# Patient Record
Sex: MALE | Race: WHITE | NOT HISPANIC OR LATINO | Employment: OTHER | URBAN - NONMETROPOLITAN AREA
[De-identification: names, ages, dates, MRNs, and addresses within clinical notes are randomized per-mention and may not be internally consistent; named-entity substitution may affect disease eponyms.]

---

## 2017-01-03 ENCOUNTER — HISTORY (OUTPATIENT)
Dept: FAMILY MEDICINE | Facility: OTHER | Age: 57
End: 2017-01-03

## 2017-01-03 ENCOUNTER — OFFICE VISIT - GICH (OUTPATIENT)
Dept: FAMILY MEDICINE | Facility: OTHER | Age: 57
End: 2017-01-03

## 2017-01-03 DIAGNOSIS — J02.9 ACUTE PHARYNGITIS: ICD-10-CM

## 2017-01-03 LAB — STREP A ANTIGEN - HISTORICAL: NEGATIVE

## 2017-01-03 ASSESSMENT — PATIENT HEALTH QUESTIONNAIRE - PHQ9: SUM OF ALL RESPONSES TO PHQ QUESTIONS 1-9: 0

## 2017-05-11 ENCOUNTER — HISTORY (OUTPATIENT)
Dept: FAMILY MEDICINE | Facility: OTHER | Age: 57
End: 2017-05-11

## 2017-05-11 ENCOUNTER — OFFICE VISIT - GICH (OUTPATIENT)
Dept: FAMILY MEDICINE | Facility: OTHER | Age: 57
End: 2017-05-11

## 2017-05-11 DIAGNOSIS — R21 RASH AND OTHER NONSPECIFIC SKIN ERUPTION: ICD-10-CM

## 2017-05-11 DIAGNOSIS — B00.1 HERPESVIRAL VESICULAR DERMATITIS: ICD-10-CM

## 2017-09-06 ENCOUNTER — COMMUNICATION - GICH (OUTPATIENT)
Dept: FAMILY MEDICINE | Facility: OTHER | Age: 57
End: 2017-09-06

## 2017-09-06 DIAGNOSIS — R03.0 ELEVATED BLOOD PRESSURE READING WITHOUT DIAGNOSIS OF HYPERTENSION: ICD-10-CM

## 2017-12-03 ENCOUNTER — COMMUNICATION - GICH (OUTPATIENT)
Dept: FAMILY MEDICINE | Facility: OTHER | Age: 57
End: 2017-12-03

## 2017-12-03 DIAGNOSIS — Z00.00 ENCOUNTER FOR GENERAL ADULT MEDICAL EXAMINATION WITHOUT ABNORMAL FINDINGS: ICD-10-CM

## 2017-12-04 ENCOUNTER — COMMUNICATION - GICH (OUTPATIENT)
Dept: FAMILY MEDICINE | Facility: OTHER | Age: 57
End: 2017-12-04

## 2017-12-04 DIAGNOSIS — R03.0 ELEVATED BLOOD PRESSURE READING WITHOUT DIAGNOSIS OF HYPERTENSION: ICD-10-CM

## 2017-12-05 ENCOUNTER — COMMUNICATION - GICH (OUTPATIENT)
Dept: FAMILY MEDICINE | Facility: OTHER | Age: 57
End: 2017-12-05

## 2017-12-05 DIAGNOSIS — B00.1 HERPESVIRAL VESICULAR DERMATITIS: ICD-10-CM

## 2017-12-11 ENCOUNTER — HISTORY (OUTPATIENT)
Dept: FAMILY MEDICINE | Facility: OTHER | Age: 57
End: 2017-12-11

## 2017-12-11 ENCOUNTER — OFFICE VISIT - GICH (OUTPATIENT)
Dept: FAMILY MEDICINE | Facility: OTHER | Age: 57
End: 2017-12-11

## 2017-12-11 DIAGNOSIS — R21 RASH AND OTHER NONSPECIFIC SKIN ERUPTION: ICD-10-CM

## 2017-12-11 DIAGNOSIS — B35.1 TINEA UNGUIUM: ICD-10-CM

## 2017-12-11 DIAGNOSIS — I10 ESSENTIAL (PRIMARY) HYPERTENSION: ICD-10-CM

## 2017-12-28 NOTE — TELEPHONE ENCOUNTER
Patient Information     Patient Name MRN Sex Janell Roth [id number] Male 1960      Telephone Encounter by Lilia Cohn RN at 2017  2:22 PM     Author:  Lilia Cohn RN Service:  (none) Author Type:  NURS- Registered Nurse     Filed:  2017  2:32 PM Encounter Date:  2017 Status:  Signed     :  Lilia Cohn RN (NURS- Registered Nurse)            Ace Inhibitors  Lisinopril     Office visit in the past 12 months or per provider note.    [facility]  Next visit with DONALDO COLEMAN is on: No future appointment listed with this provider  Next visit with Family Practice is on: No future appointment listed in this department    Lab test requirements:  Creatinine and Potassium annually, if ordering lab, order BMP.  CREATININE (mg/dL)    Date Value   2016 1.00     POTASSIUM (mmol/L)    Date Value   2016 4.6       Medications were reviewed at office visits for acute problems, with no annual labs completed since 16. Donaldo Coleman MD indicates no certain plan for follow-up in office visit notes. Will refill for 3 months, and send letter recommending a medication management visit with annual labs.   Prescription refilled per RN Medication Refill Policy.................... Lilia Cohn RN ....................  2017   2:27 PM

## 2018-01-02 NOTE — PROGRESS NOTES
"Patient Information     Patient Name MRJanell Suazo [id number] Male 1960      Progress Notes by Donaldo Coleman MD at 1/3/2017  9:45 AM     Author:  Donaldo Coleman MD Service:  (none) Author Type:  Physician     Filed:  1/3/2017 10:40 AM Encounter Date:  1/3/2017 Status:  Signed     :  Donaldo Coleman MD (Physician)            There are no exam notes on file for this visit.  Janell Larsen is a 56 y.o. male who presents for   Chief Complaint     Patient presents with       Throat Problem      Sore throat     HPI: Mr. Larsen comes in with ST for 4-5 days; hehas been taking cold medications which has raised his BP so this is discouraged. Afeb and no known exposures. Cough is irritating but not bad at night.   Past Medical History     Diagnosis  Date     Cold sore 2013     No past surgical history on file.  Family History       Problem   Relation Age of Onset     Cancer-colon  Father      62       Cancer  Paternal Uncle      80 leukemia       Current Outpatient Prescriptions       Medication  Sig Dispense Refill     acyclovir (ZOVIRAX) 200 mg capsule Take 1 capsule by mouth 5 times daily. 25 capsule 4     lisinopril (PRINIVIL; ZESTRIL) 20 mg tablet Take 1 tablet by mouth once daily. 90 tablet 3     omeprazole (PRILOSEC) 20 mg Delayed-Release capsule Take 1 capsule by mouth once daily before a meal. 90 capsule 3     No current facility-administered medications for this visit.      Medications have been reviewed by me and are current to the best of my knowledge and ability.    Allergies     Allergen  Reactions     Septra [Sulfamethoxazole-Trimethoprim] Rash         EXAM:   Vitals:     17 0946   BP: 168/100   Temp: 97  F (36.1  C)   TempSrc: Temporal   Weight: 90.7 kg (200 lb)   Height: 1.74 m (5' 8.5\")     General Appearance: Pleasant, alert, appropriate appearance for age. No acute distress  Head Exam: Normocephalic, without obvious abnormality.  Ear Exam: Normal TM's " bilaterally. Normal auditory canals and external ears. Non-tender.  OroPharynx Exam: Teeth: normal dentition and gums  Neck Exam: neck supple with no rigidity  Chest/Respiratory Exam: Normal chest wall and respirations. Clear to auscultation.  Cardiovascular Exam: Regular rate and rhythm. S1, S2, no murmur, click, gallop, or rubs.  ASSESSMENT AND PLAN:  1. Sore throat  Viral vs allergic  - RAPID STREP WITH REFLEX CULTURE; Future  - RAPID STREP WITH REFLEX CULTURE

## 2018-01-05 NOTE — NURSING NOTE
Patient Information     Patient Name MRN Janell Oviedo [id number] Male 1960      Nursing Note by Keyanna Renee at 2017  1:30 PM     Author:  Keyanna Renee Service:  (none) Author Type:  (none)     Filed:  2017  2:07 PM Encounter Date:  2017 Status:  Signed     :  Keyanna Renee            Patient presents to the clinic with a rash on his back.  It's been there about 2 months.  Keyanna Renee LPN....................2017 1:48 PM

## 2018-01-05 NOTE — PROGRESS NOTES
Patient Information     Patient Name MRN Sex Janell Roth [id number] Male 1960      Progress Notes by Donaldo Coleman MD at 2017  1:30 PM     Author:  Donaldo Coleman MD Service:  (none) Author Type:  Physician     Filed:  2017  2:16 PM Encounter Date:  2017 Status:  Signed     :  Donaldo Coleman MD (Physician)            Nursing Notes:   Keyanna Renee  2017  2:07 PM  Signed  Patient presents to the clinic with a rash on his back.  It's been there about 2 months.  Keyanna DEEPIKAMary Renee LPN....................2017 1:48 PM    Janell Larsen is a 57 y.o. male who presents for   Chief Complaint     Patient presents with       Rash      on back     HPI: Mr. Larsen has an itchy 2.5 cm slight raised rash right side upper back that has been there 2 months. No specific memory of specific incident that started this . Pruritic without pain. Started small and grew but same size past month.   Past Medical History:     Diagnosis  Date     Cold sore 2013     No past surgical history on file.  Family History       Problem   Relation Age of Onset     Cancer-colon  Father      62       Cancer  Paternal Uncle      80 leukemia       Current Outpatient Prescriptions       Medication  Sig Dispense Refill     acyclovir (ZOVIRAX) 200 mg capsule Take 1 capsule by mouth 5 times daily. 25 capsule 4     lisinopril (PRINIVIL; ZESTRIL) 20 mg tablet Take 1 tablet by mouth once daily. 90 tablet 3     omeprazole (PRILOSEC) 20 mg Delayed-Release capsule Take 1 capsule by mouth once daily before a meal. 90 capsule 3     No current facility-administered medications for this visit.      Medications have been reviewed by me and are current to the best of my knowledge and ability.    Allergies     Allergen  Reactions     Septra [Sulfamethoxazole-Trimethoprim] Rash        EXAM:   Vitals:     17 1349   BP: 130/88   Temp: 98.1  F (36.7  C)   TempSrc: Temporal   Weight: 90.3 kg (199 lb)  "  Height: 1.74 m (5' 8.5\")     General Appearance: Pleasant, alert, appropriate appearance for age. No acute distress  Skin: has 2.5 cm raised red somewhat dry looking rash- no drainage  ASSESSMENT AND PLAN:  1. Rash  Antibiotic in case of bacteria and antifungal in case of tinea which is the diagnosis I favor                 "

## 2018-01-11 ENCOUNTER — HISTORY (OUTPATIENT)
Dept: FAMILY MEDICINE | Facility: OTHER | Age: 58
End: 2018-01-11

## 2018-01-11 ENCOUNTER — OFFICE VISIT - GICH (OUTPATIENT)
Dept: FAMILY MEDICINE | Facility: OTHER | Age: 58
End: 2018-01-11

## 2018-01-11 DIAGNOSIS — R21 RASH AND OTHER NONSPECIFIC SKIN ERUPTION: ICD-10-CM

## 2018-01-24 ENCOUNTER — DOCUMENTATION ONLY (OUTPATIENT)
Dept: FAMILY MEDICINE | Facility: OTHER | Age: 58
End: 2018-01-24

## 2018-01-24 PROBLEM — I10 HYPERTENSION: Status: ACTIVE | Noted: 2017-12-11

## 2018-01-24 RX ORDER — LISINOPRIL 20 MG/1
20 TABLET ORAL DAILY
COMMUNITY
Start: 2017-12-05 | End: 2018-06-04

## 2018-01-24 RX ORDER — TRIAMCINOLONE ACETONIDE 1 MG/G
CREAM TOPICAL 3 TIMES DAILY
COMMUNITY
Start: 2017-05-11 | End: 2018-08-24

## 2018-01-24 RX ORDER — NYSTATIN 100000 U/G
CREAM TOPICAL
COMMUNITY
Start: 2017-12-11 | End: 2018-08-24

## 2018-01-24 RX ORDER — ACYCLOVIR 200 MG/1
1 CAPSULE ORAL
COMMUNITY
Start: 2017-12-07 | End: 2018-08-24

## 2018-01-25 ENCOUNTER — OFFICE VISIT - GICH (OUTPATIENT)
Dept: FAMILY MEDICINE | Facility: OTHER | Age: 58
End: 2018-01-25

## 2018-01-25 ENCOUNTER — HISTORY (OUTPATIENT)
Dept: FAMILY MEDICINE | Facility: OTHER | Age: 58
End: 2018-01-25

## 2018-01-25 DIAGNOSIS — B08.4 ENTEROVIRAL VESICULAR STOMATITIS WITH EXANTHEM: ICD-10-CM

## 2018-01-26 VITALS
SYSTOLIC BLOOD PRESSURE: 168 MMHG | BODY MASS INDEX: 29.62 KG/M2 | WEIGHT: 200 LBS | HEIGHT: 69 IN | DIASTOLIC BLOOD PRESSURE: 100 MMHG | TEMPERATURE: 97 F

## 2018-01-26 VITALS
DIASTOLIC BLOOD PRESSURE: 88 MMHG | TEMPERATURE: 98.1 F | SYSTOLIC BLOOD PRESSURE: 130 MMHG | BODY MASS INDEX: 29.47 KG/M2 | HEIGHT: 69 IN | WEIGHT: 199 LBS

## 2018-01-30 ENCOUNTER — HISTORY (OUTPATIENT)
Dept: PEDIATRICS | Facility: OTHER | Age: 58
End: 2018-01-30

## 2018-01-30 ENCOUNTER — OFFICE VISIT - GICH (OUTPATIENT)
Dept: PEDIATRICS | Facility: OTHER | Age: 58
End: 2018-01-30

## 2018-01-30 DIAGNOSIS — R21 RASH AND OTHER NONSPECIFIC SKIN ERUPTION: ICD-10-CM

## 2018-01-31 ENCOUNTER — AMBULATORY - GICH (OUTPATIENT)
Dept: SCHEDULING | Facility: OTHER | Age: 58
End: 2018-01-31

## 2018-02-01 LAB — HSV AB, IGM - HISTORICAL: NEGATIVE

## 2018-02-04 ASSESSMENT — PATIENT HEALTH QUESTIONNAIRE - PHQ9: SUM OF ALL RESPONSES TO PHQ QUESTIONS 1-9: 0

## 2018-02-05 ENCOUNTER — AMBULATORY - GICH (OUTPATIENT)
Dept: SCHEDULING | Facility: OTHER | Age: 58
End: 2018-02-05

## 2018-02-09 VITALS
DIASTOLIC BLOOD PRESSURE: 80 MMHG | WEIGHT: 195.2 LBS | SYSTOLIC BLOOD PRESSURE: 134 MMHG | HEART RATE: 60 BPM | TEMPERATURE: 97.8 F | BODY MASS INDEX: 29.24 KG/M2

## 2018-02-09 VITALS
HEART RATE: 64 BPM | TEMPERATURE: 96.2 F | HEIGHT: 69 IN | WEIGHT: 192 LBS | BODY MASS INDEX: 28.44 KG/M2 | SYSTOLIC BLOOD PRESSURE: 134 MMHG | DIASTOLIC BLOOD PRESSURE: 84 MMHG

## 2018-02-09 VITALS
DIASTOLIC BLOOD PRESSURE: 100 MMHG | HEIGHT: 69 IN | WEIGHT: 199 LBS | TEMPERATURE: 97.4 F | BODY MASS INDEX: 29.47 KG/M2 | SYSTOLIC BLOOD PRESSURE: 144 MMHG

## 2018-02-12 NOTE — NURSING NOTE
Patient Information     Patient Name MRN Janell Oviedo [id number] Male 1960      Nursing Note by Keyanna Renee at 2018 12:00 PM     Author:  Keyanna Renee Service:  (none) Author Type:  (none)     Filed:  2018 12:07 PM Encounter Date:  2018 Status:  Signed     :  Keyanna Renee            Patient presents to the clinic with some painful bumps on his hands, he noticed them 6 days ago.   Not they are moving to other parts of his body.  Keyanna Renee LPN....................2018 11:56 AM

## 2018-02-12 NOTE — TELEPHONE ENCOUNTER
Patient Information     Patient Name MRN Sex Janell Roth [id number] Male 1960      Telephone Encounter by Lilia Marshall RN at 2017  9:04 AM     Author:  Lilia Marshall RN Service:  (none) Author Type:  NURS- Registered Nurse     Filed:  2017  9:09 AM Encounter Date:  2017 Status:  Signed     :  Lilia Marshall RN (NURS- Registered Nurse)            [facility] pharmacy and pt request a refill Rx for lisinopril (Prinivil; Zestril).    Ace Inhibitors    Office visit in the past 12 months or per provider note.    Last visit with ANGEILNA STARR was on: 2017 in [facility]  Next visit with ANGELINA STARR is on: No future appointment listed with this provider  Next visit with Family Practice is on: No future appointment listed in this department    Lab test requirements:  Creatinine and Potassium annually, if ordering lab, order BMP.  CREATININE (mg/dL)    Date Value   2016 1.00     POTASSIUM (mmol/L)    Date Value   2016 4.6       Max refill for 12 months from last office visit or per provider note    Prescription refilled per RN Medication Refill Policy.................... Lilia Marshall RN ....................  2017   9:07 AM

## 2018-02-12 NOTE — TELEPHONE ENCOUNTER
Patient Information     Patient Name MRN Sex Janell Roth [id number] Male 1960      Telephone Encounter by Lilia Viera RN at 2017  4:25 PM     Author:  Lilia Viera RN Service:  (none) Author Type:  NURS- Registered Nurse     Filed:  2017  4:27 PM Encounter Date:  12/3/2017 Status:  Signed     :  Lilia Viera RN (NURS- Registered Nurse)            Proton Pump Inhibitors    Office visit in the past 12 months or per provider note.    [facility]  Next visit with ANGELINA STARR is on: No future appointment listed with this provider  Next visit with Family Practice is on: No future appointment listed in this department    Max refill for 12 months from last office visit or per provider note.    Prescription refilled per RN Medication Refill Policy.................... Lilia Viera RN ....................  2017   4:26 PM

## 2018-02-12 NOTE — NURSING NOTE
Patient Information     Patient Name MRN Janell Oviedo [id number] Male 1960      Nursing Note by Keyanna Renee at 2017  3:15 PM     Author:  Keyanna Renee Service:  (none) Author Type:  (none)     Filed:  2017  3:21 PM Encounter Date:  2017 Status:  Signed     :  Keyanna Renee            Patient presents to the clinic with foot pain.  He's had trouble with his toes on the right and pain in his left.   It's been going on for 2 1/2 months.    Keyanna Renee LPN....................2017 3:05 PM

## 2018-02-12 NOTE — PROGRESS NOTES
Patient Information     Patient Name MRN Janell Oviedo [id number] Male 1960      Progress Notes by Donaldo Coleman MD at 2018 12:00 PM     Author:  Donaldo Coleman MD Service:  (none) Author Type:  Physician     Filed:  2018 12:17 PM Encounter Date:  2018 Status:  Signed     :  Donaldo Coleman MD (Physician)            Nursing Notes:   Keyanna Renee  2018 12:07 PM  Signed  Patient presents to the clinic with some painful bumps on his hands, he noticed them 6 days ago.   Not they are moving to other parts of his body.  Keyanna Renee LPN....................2018 11:56 AM    Janell Larsen is a 57 y.o. male who presents for   Chief Complaint     Patient presents with       Derm Problem      Bumps on hands, now moving to other parts     HPI: Mr. Larsen comes in with 5-6 days of sores on his hands and feet and now his mouth . He is not aware of any contacts- he is not aware of fever. He just started with the oral lesions today but has had the hand and foot spots for 5-6 days. The sores are especially sore on the palms of his hands. Slight cough for several weeks.   Past Medical History:     Diagnosis  Date     Cold sore 2013     No past surgical history on file.  Family History       Problem   Relation Age of Onset     Cancer-colon  Father      62       Cancer  Paternal Uncle      80 leukemia       Current Outpatient Prescriptions       Medication  Sig Dispense Refill     acyclovir (ZOVIRAX) 200 mg capsule TAKE 1 CAPSULE BY MOUTH 5 TIMES DAILY. 25 capsule 3     lisinopril (PRINIVIL; ZESTRIL) 20 mg tablet TAKE 1 TABLET BY MOUTH ONCE DAILY. 90 tablet 1     nystatin (MYCOSTATIN) cream Apply  topically to affected area(s) 2 times daily. Apply 1st 15 g 0     omeprazole (PRILOSEC) 20 mg Delayed-Release capsule TAKE 1 CAPSULE BY MOUTH ONCE DAILY BEFORE A MEAL. 90 capsule 0     triamcinolone (ARISTOCORT; KENALOG) 0.1 % cream Apply  topically to affected  area(s) 3 times daily. 1 Tube 0     No current facility-administered medications for this visit.      Medications have been reviewed by me and are current to the best of my knowledge and ability.    Allergies     Allergen  Reactions     Septra [Sulfamethoxazole-Trimethoprim] Rash       EXAM:   There were no vitals filed for this visit.  General Appearance: Pleasant, alert, appropriate appearance for age. No acute distress  Chest/Respiratory Exam: Normal chest wall and respirations. Clear to auscultation.  Cardiovascular Exam: Regular rate and rhythm. S1, S2, no murmur, click, gallop, or rubs.  Skin: has pustules on hands , feet and mouth with some spots appearing as though  They have had secondary bleeding into the spots  ASSESSMENT AND PLAN:  1. Rash  Hand, foot, and mouth disease- hygienic measures discussed/ lungs are clear and this is prolonged viral illness

## 2018-02-12 NOTE — PROGRESS NOTES
Patient Information     Patient Name MRN Sex Janell Roth [id number] Male 1960      Progress Notes by Donaldo Coleman MD at 2017  3:15 PM     Author:  Donaldo Coleman MD Service:  (none) Author Type:  Physician     Filed:  2017  3:31 PM Encounter Date:  2017 Status:  Signed     :  Donaldo Coleman MD (Physician)            Nursing Notes:   Keyanna Renee  2017  3:21 PM  Signed  Patient presents to the clinic with foot pain.  He's had trouble with his toes on the right and pain in his left.   It's been going on for 2 1/2 months.    Keyanna Renee LPN....................2017 3:05 PM    Janell Larsen is a 57 y.o. male who presents for   Chief Complaint     Patient presents with       Foot Problem      Foot pain     HPI: Mr. Larsen has toenail fungus on R foot; we discussed treatment options and he will stay with mechanical treatment. He has some metatarsalgia symptoms and we discussed arch supports. Has large bunions from previous trauma  Past Medical History:     Diagnosis  Date     Cold sore 2013     No past surgical history on file.  Family History       Problem   Relation Age of Onset     Cancer-colon  Father      62       Cancer  Paternal Uncle      80 leukemia       Current Outpatient Prescriptions       Medication  Sig Dispense Refill     acyclovir (ZOVIRAX) 200 mg capsule TAKE 1 CAPSULE BY MOUTH 5 TIMES DAILY. 25 capsule 3     lisinopril (PRINIVIL; ZESTRIL) 20 mg tablet TAKE 1 TABLET BY MOUTH ONCE DAILY. 90 tablet 1     nystatin (MYCOSTATIN) cream Apply  topically to affected area(s) 2 times daily. Apply 1st 15 g 0     omeprazole (PRILOSEC) 20 mg Delayed-Release capsule TAKE 1 CAPSULE BY MOUTH ONCE DAILY BEFORE A MEAL. 90 capsule 0     triamcinolone (ARISTOCORT; KENALOG) 0.1 % cream Apply  topically to affected area(s) 3 times daily. 1 Tube 0     No current facility-administered medications for this visit.      Medications have been  "reviewed by me and are current to the best of my knowledge and ability.    Allergies     Allergen  Reactions     Septra [Sulfamethoxazole-Trimethoprim] Rash        EXAM:   Vitals:      12/11/17 1507 12/11/17 1524   BP: 162/100 144/100   Temp: 97.4  F (36.3  C)    Weight: 90.3 kg (199 lb)    Height: 1.74 m (5' 8.5\")      General Appearance: Pleasant, alert, appropriate appearance for age. No acute distress  Foot Exam:toenail fungus R foot and large bunions  ASSESSMENT AND PLAN:  1. Toenail fungus  Mechanical means    2. Hypertension   very good at home                 "

## 2018-02-12 NOTE — TELEPHONE ENCOUNTER
Patient Information     Patient Name MRN Sex Janell Roth [id number] Male 1960      Telephone Encounter by Lilia Viera RN at 2017  9:44 AM     Author:  Lilia Viera RN Service:  (none) Author Type:  NURS- Registered Nurse     Filed:  2017  9:47 AM Encounter Date:  2017 Status:  Signed     :  Lilia Virea RN (NURS- Registered Nurse)            PLEASE REVIEW, SIGN AND SEND AS APPROPRIATE: THANK YOU.    This is a Refill request from: [facility]  Name of Medication: acyclovir (ZOVIRAX)  Quantity requested: 25 caps  Last fill date: 2017  Due for refill: Yes  Last visit with DONALDO COLEMAN was on: 2017 in [facility]  PCP:  Donaldo Coleman MD  Controlled Substance Agreement:  N/a   Diagnosis r/t this medication request: Cold sore     Unable to complete prescription refill per RN Medication Refill Policy.................... Lilia Viera RN ....................  2017   9:45 AM

## 2018-02-13 NOTE — NURSING NOTE
Patient Information     Patient Name MRN Janell Oviedo [id number] Male 1960      Nursing Note by Mar Sullivan at 2018  2:15 PM     Author:  Mar Sullivan Service:  (none) Author Type:  (none)     Filed:  2018  2:53 PM Encounter Date:  2018 Status:  Signed     :  Mar Sullivan            Patient presents in the clinic with concerns of on going sores on his hands, feet, legs, shoulders, and back. Patient was in two weeks ago and diagnosed with hand foot and mouth, patient stated he got better but now is getting worse again.  Mar Sullivan, YUDI............................ 2018 2:18 PM

## 2018-02-13 NOTE — PROGRESS NOTES
Patient Information     Patient Name MRJanell Suazo [id number] Male 1960      Progress Notes by Donaldo Coleman MD at 2018  2:15 PM     Author:  Donaldo Coleman MD Service:  (none) Author Type:  Physician     Filed:  2018  2:58 PM Encounter Date:  2018 Status:  Signed     :  Donaldo Coleman MD (Physician)            Nursing Notes:   Mar Sullivan  2018  2:53 PM  Signed  Patient presents in the clinic with concerns of on going sores on his hands, feet, legs, shoulders, and back. Patient was in two weeks ago and diagnosed with hand foot and mouth, patient stated he got better but now is getting worse again.  Mar Sullivan LPN............................ 2018 2:18 PM    Janell Larsen is a 57 y.o. male who presents for   Chief Complaint    Patient presents with      Derm Problem     HPI: Mr. Larsen has had hand , foot , mouth disease and it was clearing but now has flared again without fever; he is getting new spots taht do not have the water blisters and some of old spots are more red again and getting bigger. They hurt on hand and itch on arms- hehas some lesions arms and buttocks  Past Medical History:     Diagnosis  Date     Cold sore 2013     No past surgical history on file.  Family History       Problem   Relation Age of Onset     Cancer-colon  Father      62       Cancer  Paternal Uncle      80 leukemia       Current Outpatient Prescriptions       Medication  Sig Dispense Refill     acyclovir (ZOVIRAX) 200 mg capsule TAKE 1 CAPSULE BY MOUTH 5 TIMES DAILY. 25 capsule 3     lisinopril (PRINIVIL; ZESTRIL) 20 mg tablet TAKE 1 TABLET BY MOUTH ONCE DAILY. 90 tablet 1     nystatin (MYCOSTATIN) cream Apply  topically to affected area(s) 2 times daily. Apply 1st 15 g 0     omeprazole (PRILOSEC) 20 mg Delayed-Release capsule TAKE 1 CAPSULE BY MOUTH ONCE DAILY BEFORE A MEAL. 90 capsule 0     triamcinolone (ARISTOCORT; KENALOG) 0.1 % cream Apply   topically to affected area(s) 3 times daily. 1 Tube 0     No current facility-administered medications for this visit.      Medications have been reviewed by me and are current to the best of my knowledge and ability.    Allergies     Allergen  Reactions     Septra [Sulfamethoxazole-Trimethoprim] Rash       EXAM:   Vitals:     01/25/18 1419   BP: 134/80   Cuff Site: Right Arm   Position: Sitting   Cuff Size: Adult Large   Pulse: 60   Temp: 97.8  F (36.6  C)   TempSrc: Temporal   Weight: 88.5 kg (195 lb 3.2 oz)     General Appearance: Pleasant, alert, appropriate appearance for age. No acute distress  Chest/Respiratory Exam: Normal chest wall and respirations. Clear to auscultation.  Cardiovascular Exam: Regular rate and rhythm. S1, S2, no murmur, click, gallop, or rubs.  Skin: rash over hand , feet-toes, and mouth with some lesions up arms and on buttocks; now with secondary red areas without blisters  ASSESSMENT AND PLAN:  1. Hand, foot and mouth disease  Secondary bacterial infection- treat with keflex as he is allergic to silfa

## 2018-02-13 NOTE — NURSING NOTE
Patient Information     Patient Name MRN Janell Oviedo [id number] Male 1960      Nursing Note by Ayse Espino at 2018  8:45 AM     Author:  Ayse Espino Service:  (none) Author Type:  (none)     Filed:  2018  9:00 AM Encounter Date:  2018 Status:  Signed     :  Ayse Espino            Patient presents to clinic for ongoing rash to hands and now is spreading to other areas.  Ayse Espino LPN ....................  2018   8:48 AM

## 2018-02-13 NOTE — PROGRESS NOTES
"Patient Information     Patient Name MRN Janell Oviedo [id number] Male 1960      Progress Notes by Trey Farley MD at 2018  8:45 AM     Author:  Trey Farley MD Service:  (none) Author Type:  Physician     Filed:  2018  9:26 AM Encounter Date:  2018 Status:  Signed     :  Trey Farley MD (Physician)            Subjective  Janell Larsen is a 57 y.o. male who presents for rash on hands. Was initially seen by Dr. Coleman on 2018. Had a rash on his hands. Described as small white bumps with white fluid underneath. Diagnosed with hand-foot-and-mouth and reassurance provided. Subsequent he followed up on 2018. Thought to have a secondary bacterial infection and treated with Keflex. The rash got slightly better but it has persisted despite continuing his Keflex. The hands are very uncomfortable. They both hurt and itch. At the beginning he had 3-4 spots on his anterior lower lip. He doesn't recall any sores inside of his mouth. Hasn't been sexually active for years. No known exposure to gonorrhea or syphilis.    Problem List/PMH: reviewed in EMR, and made relevant updates today.  Medications: reviewed in EMR, and made relevant updates today.  Allergies: reviewed in EMR, and made relevant updates today.    Social Hx:  Social History     Substance Use Topics       Smoking status: Never Smoker     Smokeless tobacco: Never Used     Alcohol use Yes     Social History Narrative    Retired from the [employer]    likes to go fishing      I reviewed social history and made relevant updates today.    Family Hx:   Family History       Problem   Relation Age of Onset     Cancer-colon  Father      62       Cancer  Paternal Uncle      80 leukemia         Objective  Vitals: reviewed in EMR.  /84 (Cuff Site: Right Arm, Position: Sitting, Cuff Size: Adult Large)  Pulse 64  Temp 96.2  F (35.7  C) (Tympanic)   Ht 1.74 m (5' 8.5\")  Wt 87.1 kg (192 lb)  " BMI 28.77 kg/m2    Gen: Pleasant male, NAD.  HEENT: MMM  Neck: Supple  Pulm: Breathing easily  Neuro: Grossly intact  Skin: Several lesions on the palms of the hands that appear to look like blood blisters that are healing. At the base of the thumb there is some slightly raised erythematous plaques without induration.  Psychiatric: Normal affect and insight. Does not appear anxious or depressed.      Assessment    ICD-10-CM    1. Rash of hands R21 HERPES SIMPLEX IGM MARY SCREEN      AMB CONSULT TO DERMATOLOGY      valACYclovir (VALTREX) 1 gram tablet      HERPES SIMPLEX IGM MARY SCREEN       Differential diagnosis includes herpes simplex, herpes zoster, gonococcal infection, dyshidrotic eczema, others. This has been bothering him for a while. I recommend a trial of Valtrex with dermatology consults    Plan   -- Expected clinical course discussed   -- Medications and their side effects discussed   -- complete antibiotics   -- trial of Valtrex   -- dermatology consult    Signed, Trey Farley MD  Internal Medicine & Pediatrics

## 2018-03-07 DIAGNOSIS — K21.9 GASTROESOPHAGEAL REFLUX DISEASE WITHOUT ESOPHAGITIS: Primary | ICD-10-CM

## 2018-03-13 NOTE — TELEPHONE ENCOUNTER
[facility]   Name of Medication and Dose:  Omeprazole 20 mg CR capsule   Quantity requested:  90 Capsules 0 refills  Last fill date: 12/04/2017  Last Office Visit: 1/25/2018  PCP:  Donaldo Coleman MD   Controlled Substance Agreement: TAMIKO Sullivan LPN 3/13/2018 3:11 PM

## 2018-03-19 ENCOUNTER — TRANSFERRED RECORDS (OUTPATIENT)
Dept: HEALTH INFORMATION MANAGEMENT | Facility: OTHER | Age: 58
End: 2018-03-19

## 2018-04-24 ENCOUNTER — OFFICE VISIT (OUTPATIENT)
Dept: FAMILY MEDICINE | Facility: OTHER | Age: 58
End: 2018-04-24
Attending: NURSE PRACTITIONER
Payer: COMMERCIAL

## 2018-04-24 ENCOUNTER — TELEPHONE (OUTPATIENT)
Dept: FAMILY MEDICINE | Facility: OTHER | Age: 58
End: 2018-04-24

## 2018-04-24 VITALS
TEMPERATURE: 97.3 F | BODY MASS INDEX: 29.11 KG/M2 | DIASTOLIC BLOOD PRESSURE: 90 MMHG | SYSTOLIC BLOOD PRESSURE: 132 MMHG | WEIGHT: 194.25 LBS | HEART RATE: 90 BPM

## 2018-04-24 DIAGNOSIS — R11.2 NAUSEA AND VOMITING, INTRACTABILITY OF VOMITING NOT SPECIFIED, UNSPECIFIED VOMITING TYPE: Primary | ICD-10-CM

## 2018-04-24 LAB
ALBUMIN SERPL-MCNC: 4.2 G/DL (ref 3.5–5.7)
ALP SERPL-CCNC: 79 U/L (ref 34–104)
ALT SERPL W P-5'-P-CCNC: 52 U/L (ref 7–52)
AMYLASE SERPL-CCNC: 92 U/L (ref 29–103)
ANION GAP SERPL CALCULATED.3IONS-SCNC: 13 MMOL/L (ref 3–14)
AST SERPL W P-5'-P-CCNC: 56 U/L (ref 13–39)
BILIRUB SERPL-MCNC: 0.8 MG/DL (ref 0.3–1)
BUN SERPL-MCNC: 10 MG/DL (ref 7–25)
CALCIUM SERPL-MCNC: 9.7 MG/DL (ref 8.6–10.3)
CHLORIDE SERPL-SCNC: 103 MMOL/L (ref 98–107)
CO2 SERPL-SCNC: 21 MMOL/L (ref 21–31)
CREAT SERPL-MCNC: 0.87 MG/DL (ref 0.7–1.3)
ERYTHROCYTE [DISTWIDTH] IN BLOOD BY AUTOMATED COUNT: 13.2 % (ref 10–15)
GFR SERPL CREATININE-BSD FRML MDRD: 90 ML/MIN/1.7M2
GLUCOSE SERPL-MCNC: 106 MG/DL (ref 70–105)
HCT VFR BLD AUTO: 49.4 % (ref 40–53)
HGB BLD-MCNC: 17.6 G/DL (ref 13.3–17.7)
LIPASE SERPL-CCNC: 43 U/L (ref 11–82)
MCH RBC QN AUTO: 32.7 PG (ref 26.5–33)
MCHC RBC AUTO-ENTMCNC: 35.6 G/DL (ref 31.5–36.5)
MCV RBC AUTO: 92 FL (ref 78–100)
PLATELET # BLD AUTO: 201 10E9/L (ref 150–450)
POTASSIUM SERPL-SCNC: 4.3 MMOL/L (ref 3.5–5.1)
PROT SERPL-MCNC: 7.7 G/DL (ref 6.4–8.9)
RBC # BLD AUTO: 5.39 10E12/L (ref 4.4–5.9)
SODIUM SERPL-SCNC: 137 MMOL/L (ref 134–144)
WBC # BLD AUTO: 8.9 10E9/L (ref 4–11)

## 2018-04-24 PROCEDURE — 82150 ASSAY OF AMYLASE: CPT | Performed by: NURSE PRACTITIONER

## 2018-04-24 PROCEDURE — 83690 ASSAY OF LIPASE: CPT | Performed by: NURSE PRACTITIONER

## 2018-04-24 PROCEDURE — 80053 COMPREHEN METABOLIC PANEL: CPT | Performed by: NURSE PRACTITIONER

## 2018-04-24 PROCEDURE — 99214 OFFICE O/P EST MOD 30 MIN: CPT | Performed by: NURSE PRACTITIONER

## 2018-04-24 PROCEDURE — 85027 COMPLETE CBC AUTOMATED: CPT | Performed by: NURSE PRACTITIONER

## 2018-04-24 PROCEDURE — 36415 COLL VENOUS BLD VENIPUNCTURE: CPT | Performed by: NURSE PRACTITIONER

## 2018-04-24 RX ORDER — ONDANSETRON 8 MG/1
8 TABLET, FILM COATED ORAL EVERY 8 HOURS PRN
Qty: 20 TABLET | Refills: 0 | Status: SHIPPED | OUTPATIENT
Start: 2018-04-24 | End: 2018-08-24

## 2018-04-24 ASSESSMENT — PAIN SCALES - GENERAL: PAINLEVEL: NO PAIN (0)

## 2018-04-24 NOTE — NURSING NOTE
Patient presents to clinic today for nausea and vomiting. He states it started 1 1/2 to 2 weeks ago.     Naty Orozco LPN...................4/24/2018  1:58 PM

## 2018-04-24 NOTE — TELEPHONE ENCOUNTER
Writer returned call to patient to discuss symptoms as noted in reason for call. Patient reports that for the last two weeks, every time he eats, he throws up. Also feels nauseated at times when he hasn't eaten. Doesn't matter on the portion size, and even just laying in bed at times he get nauseated.    Has also been throwing up water when he tries to drink it. No fever. Vomit looks like whatever he ate or drank.     Abdominal pain-not so much abdominal pain, acts like a hiatal hernia. Is not in abdominal pain.     Has been peeing and stooling as per norm.     Has voided about 3 times today already-last void was about an hour and half ago.    Writer feels as though patient can be seen in the clinic, but does recommend patient be seen today if possible. PCP with no openings today, and Dr. Farley (patient's second choice) also with no openings today.     Patient reports that he would like to see a provider for both a physical, as well as to discuss symptoms as noted above. Writer notes that Patsy Garcia, NP with openings to support this in the afternoon today. Patient happy with plan of care, is willing to see NP.     Patient would like to see NP at 1400 today, and appointment length would be 45 minutes. Patient is aware of need to check in at the unit 1 check in.     Was transferred to scheduling staff for an appointment. Patient will call back as needed for additional questions or concerns.     Writer will close this encounter at this time.    Avni Ellis RN on 4/24/2018 at 1:20 PM

## 2018-04-24 NOTE — TELEPHONE ENCOUNTER
SDG-Pt would like to speak with nurse re abd pain and vomiting. Possible work in. I went thru SD[facility] schedule with pt. Thank You.  Rosette Rowe

## 2018-04-24 NOTE — MR AVS SNAPSHOT
"              After Visit Summary   4/24/2018    Janell Larsen    MRN: [id number]           Patient Information     Date Of Birth          1960        Visit Information        Provider Department      4/24/2018 2:00 PM Patsy Garcia APRN CNP [facility]        Today's Diagnoses     Nausea and vomiting, intractability of vomiting not specified, unspecified vomiting type    -  1      Care Instructions    zofran every 8 hours as needed  Ultrasound to be scheduled          Follow-ups after your visit        Future tests that were ordered for you today     Open Future Orders        Priority Expected Expires Ordered    US Abdomen Limited Routine  4/24/2019 4/24/2018            Who to contact     If you have questions or need follow up information about today's clinic visit or your schedule please contact [facility] AND \[facility]\"" directly at [phone].  Normal or non-critical lab and imaging results will be communicated to you by [employer], letter or phone within 4 business days after the clinic has received the results. If you do not hear from us within 7 days, please contact the clinic through [employer] or phone. If you have a critical or abnormal lab result, we will notify you by phone as soon as possible.  Submit refill requests through [employer] or call your pharmacy and they will forward the refill request to us. Please allow 3 business days for your refill to be completed.          Additional Information About Your Visit        [employer] Information     [employer] lets you send messages to your doctor, view your test results, renew your prescriptions, schedule appointments and more. To sign up, go to [email or web address] . Click on \"Log in\" on the left side of the screen, which will take you to the Welcome page. Then click on \"Sign up Now\" on the right side of the page.     You will be asked to enter the access code listed below, as well as some personal information. Please " follow the directions to create your username and password.     Your access code is: [email or web address]  Expires: 2018  3:11 PM     Your access code will  in 90 days. If you need help or a new code, please call your [facility] clinic or [phone].        Care EveryWhere ID     This is your Care EveryWhere ID. This could be used by other organizations to access your [facility] medical records  [id number]        Your Vitals Were     Pulse Temperature BMI (Body Mass Index)             90 97.3  F (36.3  C) (Temporal) 29.11 kg/m2          Blood Pressure from Last 3 Encounters:   18 132/90   18 134/84   18 134/80    Weight from Last 3 Encounters:   18 194 lb 4 oz (88.1 kg)   18 192 lb (87.1 kg)   18 195 lb 3.2 oz (88.5 kg)              We Performed the Following     Amylase     CBC W PLT No Diff     Comprehensive metabolic panel     Lipase          Today's Medication Changes          These changes are accurate as of 18  3:11 PM.  If you have any questions, ask your nurse or doctor.               Start taking these medicines.        Dose/Directions    ondansetron 8 MG tablet   Commonly known as:  ZOFRAN   Used for:  Nausea and vomiting, intractability of vomiting not specified, unspecified vomiting type   Started by:  Patsy Garcia APRN CNP        Dose:  8 mg   Take 1 tablet (8 mg) by mouth every 8 hours as needed for nausea   Quantity:  20 tablet   Refills:  0            Where to get your medicines      These medications were sent to [facility] [address] IN [facility] - [place], [place] -  [address]   [address] [facility] [address]     Phone:  [phone]     ondansetron 8 MG tablet                Primary Care Provider Office Phone # Fax #    Donaldo Coleman [phone]       [address]  [facility] [address]        Equal Access to Services     [facility] AH: Hadii symone Cabral, waaxda corinna, qaybta pacheco underwood,  kulwinder cornejomarilynn joyce'aan ah. So [facility] [phone].    ATENCIÓN: Si kristyla maricarmen, tiene a mane disposición servicios gratuitos de asistencia lingüística. Naa dutton [phone].    We comply with applicable federal civil rights laws and [place] laws. We do not discriminate on the basis of race, color, national origin, age, disability, sex, sexual orientation, or gender identity.            Thank you!     Thank you for choosing [facility] AND [facility]  for your care. Our goal is always to provide you with excellent care. Hearing back from our patients is one way we can continue to improve our services. Please take a few minutes to complete the written survey that you may receive in the mail after your visit with us. Thank you!             Your Updated Medication List - Protect others around you: Learn how to safely use, store and throw away your medicines at www.disposemymeds.org.          This list is accurate as of 4/24/18  3:11 PM.  Always use your most recent med list.                   Brand Name Dispense Instructions for use Diagnosis    acyclovir 200 MG capsule    ZOVIRAX     Take 1 capsule by mouth 5 times daily        lisinopril 20 MG tablet    PRINIVIL/ZESTRIL     Take 20 mg by mouth daily        nystatin cream    MYCOSTATIN     Apply  topically to affected area(s) 2 times daily. Apply 1st        omeprazole 20 MG CR capsule    priLOSEC    90 capsule    TAKE 1 CAPSULE BY MOUTH ONCE DAILY BEFORE A MEAL.    Gastroesophageal reflux disease without esophagitis       ondansetron 8 MG tablet    ZOFRAN    20 tablet    Take 1 tablet (8 mg) by mouth every 8 hours as needed for nausea    Nausea and vomiting, intractability of vomiting not specified, unspecified vomiting type       triamcinolone 0.1 % cream    KENALOG     Apply topically 3 times daily Apply to affected areas.

## 2018-04-24 NOTE — PROGRESS NOTES
HPI:    Janell Larsen is a 57 year old male who presents to clinic today for n/v. Has had sx for about 2 weeks. Has had nausea frequently despite food intake. He has had vomiting with approx 80% of intake despite what it is. Has not found a difference with consistency or textures. Liquids seem to be a little worse than slid foods. He is able to tolerate water. Has tried to have smaller portions to see if this helps. No abdominal pain. Having normal BM's. Has had some runny/looser stools. Goes 4-5 times daily, this is normal for him. No fevers. No changes in medications recently. He is still taking omeprazole daily. He did have an EGD about 17 years ago, had hiatal hernia at that time. He did have surgery for this. No changes in weight. Denies any heartburn or reflux.    Past Medical History:   Diagnosis Date     Herpesviral vesicular dermatitis     9/27/2013       No past surgical history on file.      Current Outpatient Prescriptions   Medication Sig Dispense Refill     acyclovir (ZOVIRAX) 200 MG capsule Take 1 capsule by mouth 5 times daily       lisinopril (PRINIVIL/ZESTRIL) 20 MG tablet Take 20 mg by mouth daily       nystatin (MYCOSTATIN) cream Apply  topically to affected area(s) 2 times daily. Apply 1st       omeprazole (PRILOSEC) 20 MG CR capsule TAKE 1 CAPSULE BY MOUTH ONCE DAILY BEFORE A MEAL. 90 capsule 0     ondansetron (ZOFRAN) 8 MG tablet Take 1 tablet (8 mg) by mouth every 8 hours as needed for nausea 20 tablet 0     triamcinolone (KENALOG) 0.1 % cream Apply topically 3 times daily Apply to affected areas.         Allergies   Allergen Reactions     Sulfamethoxazole-Trimethoprim Rash       ROS:  Pertinent positives and negatives are noted in HPI.    EXAM:  General appearance: well appearing male, in no acute distress  Respiratory: clear to auscultation bilaterally  Cardiac: RRR with no murmurs  Abdomen: soft, nontender, no masses or organomegally, normal BS  Psychological: normal affect, alert and  pleasant  Lab:   Results for orders placed or performed in visit on 04/24/18   Comprehensive metabolic panel   Result Value Ref Range    Sodium 137 134 - 144 mmol/L    Potassium 4.3 3.5 - 5.1 mmol/L    Chloride 103 98 - 107 mmol/L    Carbon Dioxide 21 21 - 31 mmol/L    Anion Gap 13 3 - 14 mmol/L    Glucose 106 (H) 70 - 105 mg/dL    Urea Nitrogen 10 7 - 25 mg/dL    Creatinine 0.87 0.70 - 1.30 mg/dL    GFR Estimate 90 >60 mL/min/1.7m2    GFR Estimate If Black >90 >60 mL/min/1.7m2    Calcium 9.7 8.6 - 10.3 mg/dL    Bilirubin Total 0.8 0.3 - 1.0 mg/dL    Albumin 4.2 3.5 - 5.7 g/dL    Protein Total 7.7 6.4 - 8.9 g/dL    Alkaline Phosphatase 79 34 - 104 U/L    ALT 52 7 - 52 U/L    AST 56 (H) 13 - 39 U/L   CBC W PLT No Diff   Result Value Ref Range    WBC 8.9 4.0 - 11.0 10e9/L    RBC Count 5.39 4.4 - 5.9 10e12/L    Hemoglobin 17.6 13.3 - 17.7 g/dL    Hematocrit 49.4 40.0 - 53.0 %    MCV 92 78 - 100 fl    MCH 32.7 26.5 - 33.0 pg    MCHC 35.6 31.5 - 36.5 g/dL    RDW 13.2 10.0 - 15.0 %    Platelet Count 201 150 - 450 10e9/L   Lipase   Result Value Ref Range    Lipase 43 11 - 82 U/L   Amylase   Result Value Ref Range    Amylase 92 29 - 103 U/L         ASSESSMENT AND PLAN:    1. Nausea and vomiting, intractability of vomiting not specified, unspecified vomiting type      Above labs reviewed and all WNL. He is atypical for gall bladder issues but will order US to rule this out. Zofran for nausea/vomiting. Continue with omeprazole. If US negative, consider CT of abdomen to rule out malignant causes. May also consider EGD if all negative and sx persist. Will f/u with test results when completed. All questions were answered and he is in agreement with plan.         Patsy Garcia..................4/24/2018 2:00 PM

## 2018-04-26 ENCOUNTER — HOSPITAL ENCOUNTER (OUTPATIENT)
Dept: ULTRASOUND IMAGING | Facility: OTHER | Age: 58
Discharge: HOME | End: 2018-04-26
Attending: NURSE PRACTITIONER | Admitting: NURSE PRACTITIONER
Payer: COMMERCIAL

## 2018-04-26 DIAGNOSIS — R11.2 NAUSEA AND VOMITING, INTRACTABILITY OF VOMITING NOT SPECIFIED, UNSPECIFIED VOMITING TYPE: ICD-10-CM

## 2018-04-26 PROCEDURE — 76705 ECHO EXAM OF ABDOMEN: CPT

## 2018-04-27 ENCOUNTER — TELEPHONE (OUTPATIENT)
Dept: FAMILY MEDICINE | Facility: OTHER | Age: 58
End: 2018-04-27

## 2018-04-27 NOTE — TELEPHONE ENCOUNTER
Patient returned call in regards to ultrasound results. Results given and verbalized understanding.  Naty Orozco LPN...................4/27/2018  1:33 PM

## 2018-05-17 ENCOUNTER — TRANSFERRED RECORDS (OUTPATIENT)
Dept: HEALTH INFORMATION MANAGEMENT | Facility: OTHER | Age: 58
End: 2018-05-17

## 2018-05-18 ENCOUNTER — OFFICE VISIT (OUTPATIENT)
Dept: PEDIATRICS | Facility: OTHER | Age: 58
End: 2018-05-18
Attending: INTERNAL MEDICINE
Payer: COMMERCIAL

## 2018-05-18 VITALS
OXYGEN SATURATION: 96 % | SYSTOLIC BLOOD PRESSURE: 130 MMHG | HEIGHT: 69 IN | HEART RATE: 84 BPM | WEIGHT: 195.4 LBS | TEMPERATURE: 97.6 F | BODY MASS INDEX: 28.94 KG/M2 | RESPIRATION RATE: 18 BRPM | DIASTOLIC BLOOD PRESSURE: 84 MMHG

## 2018-05-18 DIAGNOSIS — D03.62 MELANOMA IN SITU OF LEFT UPPER EXTREMITY (H): ICD-10-CM

## 2018-05-18 DIAGNOSIS — Z98.890 S/P NISSEN FUNDOPLICATION (WITHOUT GASTROSTOMY TUBE) PROCEDURE: ICD-10-CM

## 2018-05-18 DIAGNOSIS — K44.9 HIATAL HERNIA: ICD-10-CM

## 2018-05-18 DIAGNOSIS — Z86.19 HISTORY OF COLD SORES: ICD-10-CM

## 2018-05-18 DIAGNOSIS — I10 ESSENTIAL HYPERTENSION: ICD-10-CM

## 2018-05-18 DIAGNOSIS — Z01.818 PRE-OPERATIVE EXAMINATION: Primary | ICD-10-CM

## 2018-05-18 DIAGNOSIS — L30.9 ECZEMA, UNSPECIFIED TYPE: ICD-10-CM

## 2018-05-18 DIAGNOSIS — R11.10 VOMITING, INTRACTABILITY OF VOMITING NOT SPECIFIED, PRESENCE OF NAUSEA NOT SPECIFIED, UNSPECIFIED VOMITING TYPE: ICD-10-CM

## 2018-05-18 PROCEDURE — 93000 ELECTROCARDIOGRAM COMPLETE: CPT | Performed by: INTERNAL MEDICINE

## 2018-05-18 PROCEDURE — 99214 OFFICE O/P EST MOD 30 MIN: CPT | Mod: 25 | Performed by: INTERNAL MEDICINE

## 2018-05-18 RX ORDER — DOCOSANOL 100 MG/G
CREAM TOPICAL
Qty: 2 G | Refills: 11 | Status: SHIPPED | OUTPATIENT
Start: 2018-05-18 | End: 2018-08-24

## 2018-05-18 ASSESSMENT — PAIN SCALES - GENERAL: PAINLEVEL: NO PAIN (0)

## 2018-05-18 NOTE — NURSING NOTE
"Date of Surgery: 5/21/18  Type of Surgery: EGD  Surgeon: Dr. Felix  Hospital: [facility]  Fax: does not know    Fever/Chills or other infectious symptoms in past month: no  >10lb weight loss in past two months: no  O2 SAT: 96%    Health Care Directive/Code status: None on File/ Full code  Hx of blood transfusions:   no  Td up to date:  yes  History of VRE/MRSA:  no Date: N/A    Preoperative Evaluation: Obstructive Sleep Apnea screening    S: Snore -  Do you snore loudly? (louder than talking or loud enough to be heard through closed doors) no  T: Tired - Do you often feel tired, fatigued, or sleepy during the daytime? no  O: Observed - Has anyone ever observed you stop breathing during your sleep? no  P: Pressure - Do you have or are you being treated for high blood pressure? yes  B: BMI - BMI greater than 35kg/m2? no  A: Age - Age over 50 years old? yes  N: Neck - Neck circumference greater than 40 cm? no  G: Gender - Gender: Male? yes    Total number of \"YES\" responses:  3    Scoring: Low risk of ERICKSON 0-2  At Risk of ERICKSON: >3 High Risk of ERICKSON: 5-8    Ayse Espino 5/18/2018 12:47 PM    "

## 2018-05-18 NOTE — PROGRESS NOTES
"PREOPERATIVE HISTORY & PHYSICAL  Date of Exam: 5/18/2018  Chief Complaint   Patient presents with     Pre-Op Exam     5/21/18       Nursing Notes:   Ayse Espino LPN  5/18/2018 12:59 PM  Signed  Date of Surgery: 5/21/18  Type of Surgery: EGD  Surgeon: Dr. Felix  Hospital: [facility]  Fax: does not know    Fever/Chills or other infectious symptoms in past month: no  >10lb weight loss in past two months: no  O2 SAT: 96%    Health Care Directive/Code status: None on File/ Full code  Hx of blood transfusions:   no  Td up to date:  yes  History of VRE/MRSA:  no Date: N/A    Preoperative Evaluation: Obstructive Sleep Apnea screening    S: Snore -  Do you snore loudly? (louder than talking or loud enough to be heard through closed doors) no  T: Tired - Do you often feel tired, fatigued, or sleepy during the daytime? no  O: Observed - Has anyone ever observed you stop breathing during your sleep? no  P: Pressure - Do you have or are you being treated for high blood pressure? yes  B: BMI - BMI greater than 35kg/m2? no  A: Age - Age over 50 years old? yes  N: Neck - Neck circumference greater than 40 cm? no  G: Gender - Gender: Male? yes    Total number of \"YES\" responses:  3    Scoring: Low risk of ERICKSON 0-2  At Risk of ERICKSON: >3 High Risk of ERICKSON: 5-8    Ayse Espino 5/18/2018 12:47 PM      HPI:  I was asked to see Mr. Janell Larsen by Dr. Felix for preoperative management of hypertension.    Janell Larsen is a 58 year old male with a history of hypertension here for preoperative examination.  He has been having vomiting associated with some abdominal discomfort.  No dysphagia or odynophagia.  Saw Dr. Felix who recommended EGD.  He has a history of chronic heartburn which was significantly reduced after he had a Nissen procedure as well as hiatal hernia repair.  The most physically active he has been over the past 2 weeks was: Yardwork, raking without chest pains or palpitations..    Patient Active " Problem List    Diagnosis Date Noted     Vomiting, intractability of vomiting not specified, presence of nausea not specified, unspecified vomiting type 05/18/2018     Priority: Medium     Hiatal hernia 05/18/2018     Priority: Medium     S/P Nissen fundoplication (without gastrostomy tube) procedure 05/18/2018     Priority: Medium     Eczema, unspecified type 05/18/2018     Priority: Medium     Essential hypertension 05/18/2018     Priority: Medium     Melanoma in situ of left upper extremity (H) 05/18/2018     Priority: Medium     History of cold sores 05/18/2018     Priority: Medium     Past Medical History:   Diagnosis Date     Herpesviral vesicular dermatitis     9/27/2013     Past Surgical History:   Procedure Laterality Date     COLONOSCOPY      Dr Felix, 2015, due 2020     MELANOMA GREATER THAN AJCC STAGE 0  OR 1A      melanoma insitu     NISSEN FUNDOPLICATION  2000    with hiatal hernia repair     Current Outpatient Prescriptions   Medication Sig Dispense Refill     acyclovir (ZOVIRAX) 200 MG capsule Take 1 capsule by mouth 5 times daily       docosanol (ABREVA) 10 % CREA cream Apply 5x/day to rash at first sign of rash until rash is gone. 2 g 11     lisinopril (PRINIVIL/ZESTRIL) 20 MG tablet Take 20 mg by mouth daily       nystatin (MYCOSTATIN) cream Apply  topically to affected area(s) 2 times daily. Apply 1st       omeprazole (PRILOSEC) 20 MG CR capsule TAKE 1 CAPSULE BY MOUTH ONCE DAILY BEFORE A MEAL. 90 capsule 0     ondansetron (ZOFRAN) 8 MG tablet Take 1 tablet (8 mg) by mouth every 8 hours as needed for nausea 20 tablet 0     triamcinolone (KENALOG) 0.1 % cream Apply topically 3 times daily Apply to affected areas.       Allergies   Allergen Reactions     Sulfamethoxazole-Trimethoprim Rash     Family History   Problem Relation Age of Onset     Colon Cancer Father      Cancer-colon,62     CANCER Paternal Uncle      Cancer,80 leukemia     No Known Problems Mother      CEREBROVASCULAR DISEASE Maternal  "Grandmother 75     Anesthesia Reaction No family hx of      CLOTTING DISORDER No family hx of      Social History     Social History     Marital status: Single     Spouse name: N/A     Number of children: N/A     Years of education: N/A     Social History Main Topics     Smoking status: Never Smoker     Smokeless tobacco: Never Used     Alcohol use Yes      Comment: Whiskey once in a while     Drug use: No      Comment: Drug use: No     Sexual activity: Not Asked     Other Topics Concern     None     Social History Narrative    Retired from the power [employer]  likes to go fishing       REVIEW OF SYSTEMS:  Review of Systems:  Skin: He has been battling a lot of skin issues over the winter.  He saw the dermatologist and was diagnosed with eczema.  Hands are much better.  Fortunately they did a skin check and found a lesion on his left wrist under his watch that returned positive for melanoma in situ.  Over the last couple weeks he has a couple of spots on his lip that flared up and are starting to go away.  Eyes: Negative  Ears/Nose/Throat: Negative  Respiratory: Negative  Cardiovascular: Negative  Gastrointestinal: See HPI  Genitourinary: Negative  Musculoskeletal: Negative  Neurologic: Negative  Psychiatric: Negative  Hematologic/Lymphatic/Immunologic: Negative  Endocrine: Negative      EXAM:   Vitals: reviewed in EMR.  /84 (BP Location: Right arm, Patient Position: Sitting, Cuff Size: Adult Large)  Pulse 84  Temp 97.6  F (36.4  C) (Tympanic)  Resp 18  Ht 5' 8.5\" (1.74 m)  Wt 195 lb 6.4 oz (88.6 kg)  SpO2 96%  BMI 29.28 kg/m2    Gen: Pleasant male, NAD.  HEENT: MMM, no OP erythema.   Neck: Supple, no JVD, no bruits.  CV: RRR no m/r/g.   Pulm: CTAB no w/r/r  GI: Soft, NT, ND. No HSM. No masses. Bowel sounds present.  Neuro: Grossly intact  Msk: No lower extremity edema.  Skin: No concerning lesions.  Psychiatric: Normal affect and insight. Does not appear anxious or depressed.    DIAGNOSTICS: "   EKG:  Results for orders placed or performed in visit on 05/18/18   EKG 12-lead, tracing only    Narrative    EKG Interpretation:   Rhythm: Sinus  Rate: 74  Axis: Normal  Conduction: Normal  QRS: Normal  ST Segments: Normal  T-wave: T-wave inversion in lead III  Chambers: Possible LVH  PACs Present: No  PVCs Present: No    Impression:   Normal EKG.  Compared to May 6, 2015: Unchanged.    Signed, Trey Farley MD  Internal Medicine & Pediatrics  5/18/2018  3:53 PM           Labs:  Component      Latest Ref Rng & Units 4/24/2018   Sodium      134 - 144 mmol/L 137   Potassium      3.5 - 5.1 mmol/L 4.3   Chloride      98 - 107 mmol/L 103   Carbon Dioxide      21 - 31 mmol/L 21   Anion Gap      3 - 14 mmol/L 13   Glucose      70 - 105 mg/dL 106 (H)   Urea Nitrogen      7 - 25 mg/dL 10   Creatinine      0.70 - 1.30 mg/dL 0.87   GFR Estimate      >60 mL/min/1.7m2 90   GFR Estimate If Black      >60 mL/min/1.7m2 >90   Calcium      8.6 - 10.3 mg/dL 9.7   Bilirubin Total      0.3 - 1.0 mg/dL 0.8   Albumin      3.5 - 5.7 g/dL 4.2   Protein Total      6.4 - 8.9 g/dL 7.7   Alkaline Phosphatase      34 - 104 U/L 79   ALT      7 - 52 U/L 52   AST      13 - 39 U/L 56 (H)   WBC      4.0 - 11.0 10e9/L 8.9   RBC Count      4.4 - 5.9 10e12/L 5.39   Hemoglobin      13.3 - 17.7 g/dL 17.6   Hematocrit      40.0 - 53.0 % 49.4   MCV      78 - 100 fl 92   MCH      26.5 - 33.0 pg 32.7   MCHC      31.5 - 36.5 g/dL 35.6   RDW      10.0 - 15.0 % 13.2   Platelet Count      150 - 450 10e9/L 201   Lipase      11 - 82 U/L 43   Amylase      29 - 103 U/L 92       IMPRESSION:     ICD-10-CM    1. Pre-operative examination Z01.818 EKG 12-lead, tracing only   2. Vomiting, intractability of vomiting not specified, presence of nausea not specified, unspecified vomiting type R11.10    3. Hiatal hernia K44.9    4. S/P Nissen fundoplication (without gastrostomy tube) procedure Z98.890    5. Eczema, unspecified type L30.9    6. Essential hypertension I10 EKG  12-lead, tracing only   7. Melanoma in situ of left upper extremity (H) D03.62    8. History of cold sores Z86.19 docosanol (ABREVA) 10 % CREA cream       For above listed surgery and anesthesia:   Patient is at increased risk forperioperative complications based on hypertension.    RECOMMENDATIONS:   APPROVAL GIVEN to proceed with proposed procedure, without further diagnostic evaluation    Patient is on chronic pain medications: No  Patient is on antiplatelet/anticoagulation: No  Other medications that need adjustment perioperatively: No  Patient Instructions    -- Hold aspirin, Advil/ibuprofen, Aleve/naproxen for 7 days before surgery   -- Acetaminophen (Tylenol) is okay   -- Hold vitamins and herbal remedies for 7 days before surgery        Signed, Trey Farley MD  Internal Medicine & Pediatrics    CC Dr. Felix

## 2018-05-18 NOTE — MR AVS SNAPSHOT
"              After Visit Summary   5/18/2018    Janell Larsen    MRN: [id number]           Patient Information     Date Of Birth          1960        Visit Information        Provider Department      5/18/2018 12:45 PM Trey Farely MD [facility]        Today's Diagnoses     Pre-operative examination    -  1    Vomiting, intractability of vomiting not specified, presence of nausea not specified, unspecified vomiting type        Hiatal hernia        S/P Nissen fundoplication (without gastrostomy tube) procedure        Eczema, unspecified type        Essential hypertension        Melanoma in situ of left upper extremity (H)        History of cold sores          Care Instructions     -- Hold aspirin, Advil/ibuprofen, Aleve/naproxen for 7 days before surgery   -- Acetaminophen (Tylenol) is okay   -- Hold vitamins and herbal remedies for 7 days before surgery            Follow-ups after your visit        Who to contact     If you have questions or need follow up information about today's clinic visit or your schedule please contact [facility] directly at [phone].  Normal or non-critical lab and imaging results will be communicated to you by [email or web address], letter or phone within 4 business days after the clinic has received the results. If you do not hear from us within 7 days, please contact the clinic through [employer] or phone. If you have a critical or abnormal lab result, we will notify you by phone as soon as possible.  Submit refill requests through Datam or call your pharmacy and they will forward the refill request to us. Please allow 3 business days for your refill to be completed.          Additional Information About Your Visit        [email or web address] Information     Datam lets you send messages to your doctor, view your test results, renew your prescriptions, schedule appointments and more. To sign up, go to [email or web address] . Click on \"Log in\" on " "the left side of the screen, which will take you to the Welcome page. Then click on \"Sign up Now\" on the right side of the page.     You will be asked to enter the access code listed below, as well as some personal information. Please follow the directions to create your username and password.     Your access code is: [email or web address]  Expires: 2018  3:11 PM     Your access code will  in 90 days. If you need help or a new code, please call your [facility] or [phone].        Care EveryWhere ID     This is your Care EveryWhere ID. This could be used by other organizations to access your [facility] medical records  [id number]        Your Vitals Were     Pulse Temperature Respirations Height Pulse Oximetry BMI (Body Mass Index)    84 97.6  F (36.4  C) (Tympanic) 18 5' 8.5\" (1.74 m) 96% 29.28 kg/m2       Blood Pressure from Last 3 Encounters:   18 130/84   18 132/90   18 134/84    Weight from Last 3 Encounters:   18 195 lb 6.4 oz (88.6 kg)   18 194 lb 4 oz (88.1 kg)   18 192 lb (87.1 kg)              We Performed the Following     EKG 12-lead, tracing only          Today's Medication Changes          These changes are accurate as of 18  1:37 PM.  If you have any questions, ask your nurse or doctor.               Start taking these medicines.        Dose/Directions    docosanol 10 % Crea cream   Commonly known as:  ABREVA   Used for:  History of cold sores   Started by:  Trey Farley MD        Apply 5x/day to rash at first sign of rash until rash is gone.   Quantity:  2 g   Refills:  11            Where to get your medicines      These medications were sent to [facility] [address] [facility] - [place], [place] -  [address] [facility] [address]     Phone:  [phone]     docosanol 10 % Crea cream                Primary Care Provider Office Phone # Fax #    Donaldo Coleman [phone]       [address] [place]  [facility] [address]        Equal Access to Services     [facility] Hospital[facility]INDEN : Hadii symone jean tali Cabral, wamaryda luqmarioha, qaybta noadanielkaren underwood, kulwinder silvermansiriapaul weems. So [facility] [phone].    ATENCIÓN: Si habla español, tiene a mane disposición servicios gratuitos de asistencia lingüística. Llame al [phone].    We comply with applicable federal civil rights laws and [place] laws. We do not discriminate on the basis of race, color, national origin, age, disability, sex, sexual orientation, or gender identity.            Thank you!     Thank you for choosing [facility] AND [facility]  for your care. Our goal is always to provide you with excellent care. Hearing back from our patients is one way we can continue to improve our services. Please take a few minutes to complete the written survey that you may receive in the mail after your visit with us. Thank you!             Your Updated Medication List - Protect others around you: Learn how to safely use, store and throw away your medicines at www.disposemymeds.org.          This list is accurate as of 5/18/18  1:37 PM.  Always use your most recent med list.                   Brand Name Dispense Instructions for use Diagnosis    acyclovir 200 MG capsule    ZOVIRAX     Take 1 capsule by mouth 5 times daily        docosanol 10 % Crea cream    ABREVA    2 g    Apply 5x/day to rash at first sign of rash until rash is gone.    History of cold sores       lisinopril 20 MG tablet    PRINIVIL/ZESTRIL     Take 20 mg by mouth daily        nystatin cream    MYCOSTATIN     Apply  topically to affected area(s) 2 times daily. Apply 1st        omeprazole 20 MG CR capsule    priLOSEC    90 capsule    TAKE 1 CAPSULE BY MOUTH ONCE DAILY BEFORE A MEAL.    Gastroesophageal reflux disease without esophagitis       ondansetron 8 MG tablet    ZOFRAN    20 tablet    Take 1 tablet (8 mg) by mouth every 8 hours as needed for nausea    Nausea and vomiting,  intractability of vomiting not specified, unspecified vomiting type       triamcinolone 0.1 % cream    KENALOG     Apply topically 3 times daily Apply to affected areas.

## 2018-05-18 NOTE — PATIENT INSTRUCTIONS
-- Hold aspirin, Advil/ibuprofen, Aleve/naproxen for 7 days before surgery   -- Acetaminophen (Tylenol) is okay   -- Hold vitamins and herbal remedies for 7 days before surgery

## 2018-05-21 ENCOUNTER — TRANSFERRED RECORDS (OUTPATIENT)
Dept: HEALTH INFORMATION MANAGEMENT | Facility: OTHER | Age: 58
End: 2018-05-21

## 2018-05-31 ENCOUNTER — HOSPITAL ENCOUNTER (OUTPATIENT)
Dept: GENERAL RADIOLOGY | Facility: OTHER | Age: 58
Discharge: HOME | End: 2018-05-31
Attending: SURGERY | Admitting: SURGERY
Payer: COMMERCIAL

## 2018-05-31 DIAGNOSIS — R63.4 WEIGHT LOSS: ICD-10-CM

## 2018-05-31 DIAGNOSIS — R19.8 GAGGING EPISODE: ICD-10-CM

## 2018-05-31 DIAGNOSIS — R05.9 COUGHING: ICD-10-CM

## 2018-05-31 PROCEDURE — 74240 X-RAY XM UPR GI TRC 1CNTRST: CPT

## 2018-05-31 PROCEDURE — 25500045 ZZH RX 255: Performed by: RADIOLOGY

## 2018-05-31 RX ADMIN — ANTACID/ANTIFLATULENT 4 G: 380; 550; 10; 10 GRANULE, EFFERVESCENT ORAL at 10:08

## 2018-06-04 ENCOUNTER — TRANSFERRED RECORDS (OUTPATIENT)
Dept: HEALTH INFORMATION MANAGEMENT | Facility: OTHER | Age: 58
End: 2018-06-04

## 2018-06-04 DIAGNOSIS — K21.9 GASTROESOPHAGEAL REFLUX DISEASE WITHOUT ESOPHAGITIS: ICD-10-CM

## 2018-06-04 DIAGNOSIS — I10 ESSENTIAL HYPERTENSION: Primary | ICD-10-CM

## 2018-06-07 RX ORDER — LISINOPRIL 20 MG/1
TABLET ORAL
Qty: 90 TABLET | Refills: 3 | Status: SHIPPED | OUTPATIENT
Start: 2018-06-07 | End: 2018-08-24

## 2018-06-07 NOTE — TELEPHONE ENCOUNTER
Refill request for Lisinopril and Omeprazole, lov 5-18-18.  Prescription refilled per RN Medication Refill Policy..................Bekah Queen 6/7/2018 11:06 AM

## 2018-06-11 ENCOUNTER — TRANSFERRED RECORDS (OUTPATIENT)
Dept: HEALTH INFORMATION MANAGEMENT | Facility: OTHER | Age: 58
End: 2018-06-11

## 2018-07-12 ENCOUNTER — TRANSFERRED RECORDS (OUTPATIENT)
Dept: HEALTH INFORMATION MANAGEMENT | Facility: OTHER | Age: 58
End: 2018-07-12

## 2018-07-24 NOTE — PROGRESS NOTES
Patient Information     Patient Name  Janell Larsen MRN  [id number] Sex  Male   1960      Letter by Donaldo Coleman MD at      Author:  Donaldo Coleman MD Service:  (none) Author Type:  (none)    Filed:   Encounter Date:  2017 Status:  (Other)           Janell Larsen  [address] [facility] [address]          2017        Dear Mr. Larsen:    This letter is to remind you that you are due for your annual exam with Donaldo Coleman MD with annual labs. Your last comprehensive medication visit was on 16, more than 12 months ago.     A limited 90-day refill of Lisinopril has been sent into your pharmacy.    Additional refills require an annual medication management appointment with Donaldo Coleman MD. Please call the clinic at [phone] to schedule your appointment.    Please call to inform us if you no longer see Donaldo Coleman MD for primary care, doing so will remove you from our call/contact list.    Thank you,        The Refill Nurse  [facility]

## 2018-07-24 NOTE — PROGRESS NOTES
Patient Information     Patient Name  Janell Larsen MRN  [id number] Sex  Male   1960      Letter by Trey Farley MD at      Author:  Trey Farley MD Service:  (none) Author Type:  (none)    Filed:   Encounter Date:  2018 Status:  (Other)           Janell Larsen  [address] [facility] [address]          2018    Dear Mr. Larsen:    Herpes blood test is negative. I hope the medicine is working, next step is the Derm consult.    Results for orders placed or performed in visit on 18      HERPES SIMPLEX IGM MARY SCREEN      Result  Value Ref Range    HSV AB, IGM Negative Negative     If you have any questions or concerns, please call the clinic at [phone].    Signed, Trey Farley MD  Internal Medicine & Pediatrics

## 2018-08-24 ENCOUNTER — OFFICE VISIT (OUTPATIENT)
Dept: INTERNAL MEDICINE | Facility: OTHER | Age: 58
End: 2018-08-24
Attending: INTERNAL MEDICINE
Payer: COMMERCIAL

## 2018-08-24 VITALS
BODY MASS INDEX: 29.26 KG/M2 | SYSTOLIC BLOOD PRESSURE: 114 MMHG | DIASTOLIC BLOOD PRESSURE: 70 MMHG | HEART RATE: 80 BPM | WEIGHT: 195.25 LBS

## 2018-08-24 DIAGNOSIS — Z98.890 S/P NISSEN FUNDOPLICATION (WITHOUT GASTROSTOMY TUBE) PROCEDURE: ICD-10-CM

## 2018-08-24 DIAGNOSIS — K76.0 HEPATIC STEATOSIS: ICD-10-CM

## 2018-08-24 DIAGNOSIS — Z90.49 S/P LAPAROSCOPIC CHOLECYSTECTOMY: ICD-10-CM

## 2018-08-24 DIAGNOSIS — I10 BENIGN ESSENTIAL HYPERTENSION: ICD-10-CM

## 2018-08-24 DIAGNOSIS — Z87.19 HISTORY OF REPAIR OF HIATAL HERNIA: ICD-10-CM

## 2018-08-24 DIAGNOSIS — R12 HEARTBURN: ICD-10-CM

## 2018-08-24 DIAGNOSIS — Z98.890 HISTORY OF REPAIR OF HIATAL HERNIA: ICD-10-CM

## 2018-08-24 DIAGNOSIS — R11.10 VOMITING, INTRACTABILITY OF VOMITING NOT SPECIFIED, PRESENCE OF NAUSEA NOT SPECIFIED, UNSPECIFIED VOMITING TYPE: Primary | ICD-10-CM

## 2018-08-24 PROCEDURE — 99214 OFFICE O/P EST MOD 30 MIN: CPT | Performed by: INTERNAL MEDICINE

## 2018-08-24 RX ORDER — LISINOPRIL 20 MG/1
20 TABLET ORAL DAILY
Qty: 90 TABLET | Refills: 3 | Status: SHIPPED | OUTPATIENT
Start: 2018-08-24 | End: 2019-09-06

## 2018-08-24 RX ORDER — METOCLOPRAMIDE 10 MG/1
1 TABLET ORAL 3 TIMES DAILY
Refills: 0 | COMMUNITY
Start: 2018-08-23 | End: 2020-06-08

## 2018-08-24 ASSESSMENT — ENCOUNTER SYMPTOMS
CONSTIPATION: 0
ARTHRALGIAS: 0
HEMATURIA: 0
NAUSEA: 1
FEVER: 0
LIGHT-HEADEDNESS: 0
CONFUSION: 0
BRUISES/BLEEDS EASILY: 0
SHORTNESS OF BREATH: 0
ABDOMINAL DISTENTION: 0
FATIGUE: 0
DIARRHEA: 1
VOMITING: 1
WHEEZING: 0
EYE PAIN: 0
AGITATION: 0
CHILLS: 0
BLOOD IN STOOL: 0
MYALGIAS: 0
DYSURIA: 0
DIZZINESS: 0
COUGH: 0
RECTAL PAIN: 0
PALPITATIONS: 0
ABDOMINAL PAIN: 0

## 2018-08-24 ASSESSMENT — ANXIETY QUESTIONNAIRES
IF YOU CHECKED OFF ANY PROBLEMS ON THIS QUESTIONNAIRE, HOW DIFFICULT HAVE THESE PROBLEMS MADE IT FOR YOU TO DO YOUR WORK, TAKE CARE OF THINGS AT HOME, OR GET ALONG WITH OTHER PEOPLE: NOT DIFFICULT AT ALL
7. FEELING AFRAID AS IF SOMETHING AWFUL MIGHT HAPPEN: NOT AT ALL
1. FEELING NERVOUS, ANXIOUS, OR ON EDGE: NOT AT ALL
5. BEING SO RESTLESS THAT IT IS HARD TO SIT STILL: NOT AT ALL
2. NOT BEING ABLE TO STOP OR CONTROL WORRYING: NOT AT ALL
3. WORRYING TOO MUCH ABOUT DIFFERENT THINGS: NOT AT ALL
6. BECOMING EASILY ANNOYED OR IRRITABLE: NOT AT ALL
GAD7 TOTAL SCORE: 0

## 2018-08-24 ASSESSMENT — PAIN SCALES - GENERAL: PAINLEVEL: NO PAIN (0)

## 2018-08-24 ASSESSMENT — PATIENT HEALTH QUESTIONNAIRE - PHQ9: 5. POOR APPETITE OR OVEREATING: NOT AT ALL

## 2018-08-24 NOTE — NURSING NOTE
"Patient presents to the clinic for establish care.    Chief Complaint   Patient presents with     Establish Care       Initial There were no vitals taken for this visit. Estimated body mass index is 29.28 kg/(m^2) as calculated from the following:    Height as of 5/18/18: 5' 8.5\" (1.74 m).    Weight as of 5/18/18: 195 lb 6.4 oz (88.6 kg).  Medication Reconciliation: complete    Kerline Lopez LPN    "

## 2018-08-24 NOTE — MR AVS SNAPSHOT
After Visit Summary   8/24/2018    Janell Larsen    MRN: [id number]           Patient Information     Date Of Birth          1960        Visit Information        Provider Department      8/24/2018 8:20 AM Amrit Patino MD [facility]        Today's Diagnoses     Vomiting, intractability of vomiting not specified, presence of nausea not specified, unspecified vomiting type    -  1    S/P Nissen fundoplication (without gastrostomy tube) procedure        Benign essential hypertension        S/P laparoscopic cholecystectomy        History of repair of hiatal hernia        Hepatic steatosis        Heartburn          Care Instructions    Recommend advanced GI work-up / testing.     May need manometry, other testing... Recommend GI consultation.     Blood pressures are well controlled. Refills sent to pharmacy.     Return in approximately 1 year, or sooner as needed for follow-up with Dr. Patino.    Clinic : [phone]  Appointment line: [phone]            Follow-ups after your visit        Additional Services     GASTROENTEROLOGY ADULT REF CONSULT ONLY       Preferred Location: [facility] [phone] or  [facility]: [phone] --- or other [facility] GI doctors....       Please be aware that coverage of these services is subject to the terms and limitations of your health insurance plan.  Call member services at your health plan with any benefit or coverage questions.  Any procedures must be performed at a [facility] facility OR coordinated by your clinic's referral office.    Please bring the following with you to your appointment:    (1) Any X-Rays, CTs or MRIs which have been performed.  Contact the facility where they were done to arrange for  prior to your scheduled appointment.    (2) List of current medications   (3) This referral request   (4) Any documents/labs given to you for this referral                  Who to contact      If you have questions or need follow up information about today's clinic visit or your schedule please contact [facility] AND [facility] directly at [phone].  Normal or non-critical lab and imaging results will be communicated to you by MyChart, letter or phone within 4 business days after the clinic has received the results. If you do not hear from us within 7 days, please contact the clinic through MyChart or phone. If you have a critical or abnormal lab result, we will notify you by phone as soon as possible.  Submit refill requests through Y-Klub or call your pharmacy and they will forward the refill request to us. Please allow 3 business days for your refill to be completed.          Additional Information About Your Visit        Care EveryWhere ID     This is your Care EveryWhere ID. This could be used by other organizations to access your [facility] medical records  [id number]        Your Vitals Were     Pulse BMI (Body Mass Index)                80 29.26 kg/m2           Blood Pressure from Last 3 Encounters:   08/24/18 114/70   05/18/18 130/84   04/24/18 132/90    Weight from Last 3 Encounters:   08/24/18 195 lb 4 oz (88.6 kg)   05/18/18 195 lb 6.4 oz (88.6 kg)   04/24/18 194 lb 4 oz (88.1 kg)              We Performed the Following     GASTROENTEROLOGY ADULT REF CONSULT ONLY          Today's Medication Changes          These changes are accurate as of 8/24/18  8:52 AM.  If you have any questions, ask your nurse or doctor.               These medicines have changed or have updated prescriptions.        Dose/Directions    lisinopril 20 MG tablet   Commonly known as:  PRINIVIL/ZESTRIL   This may have changed:  See the new instructions.   Used for:  Benign essential hypertension   Changed by:  Amrit Patino MD        Dose:  20 mg   Take 1 tablet (20 mg) by mouth daily   Quantity:  90 tablet   Refills:  3       omeprazole 20 MG CR capsule   Commonly known as:  priLOSEC   This may have changed:   See the new instructions.   Used for:  Heartburn   Changed by:  Amrit Patino MD        Dose:  20 mg   Take 1 capsule (20 mg) by mouth daily Take 30 to 60 minutes prior to 1st meal of the day   Quantity:  90 capsule   Refills:  3            Where to get your medicines      These medications were sent to [facility] [address] [facility] - [place], [place] - [address]  [address] [facility] [address]     Phone:  [phone]     lisinopril 20 MG tablet    omeprazole 20 MG CR capsule                Primary Care Provider Office Phone # Fax #    Amrit Patino [phone] [address]  [facility] [address]        Equal Access to Services     [facility] CenterILNDEN : Hadii aad ku hadasho Soomaali, waaxda luqadaha, qaybta kaalmada adeegyada, kulwinder lindsey . So [facility] [phone].    ATENCIÓN: Si habla español, tiene a mane disposición servicios gratuitos de asistencia lingüística. LlThe [facility] [phone].    We comply with applicable federal civil rights laws and [place] laws. We do not discriminate on the basis of race, color, national origin, age, disability, sex, sexual orientation, or gender identity.            Thank you!     Thank you for choosing [facility] AND [facility]  for your care. Our goal is always to provide you with excellent care. Hearing back from our patients is one way we can continue to improve our services. Please take a few minutes to complete the written survey that you may receive in the mail after your visit with us. Thank you!             Your Updated Medication List - Protect others around you: Learn how to safely use, store and throw away your medicines at www.disposemymeds.org.          This list is accurate as of 8/24/18  8:52 AM.  Always use your most recent med list.                   Brand Name Dispense Instructions for use Diagnosis    lisinopril 20 MG tablet    PRINIVIL/ZESTRIL    90 tablet    Take 1 tablet (20 mg) by  mouth daily    Benign essential hypertension       metoclopramide 10 MG tablet    REGLAN     Take 1 tablet by mouth 3 times daily        omeprazole 20 MG CR capsule    priLOSEC    90 capsule    Take 1 capsule (20 mg) by mouth daily Take 30 to 60 minutes prior to 1st meal of the day    Heartburn

## 2018-08-24 NOTE — PROGRESS NOTES
"Nursing Notes:   Kerline Lopez LPN  8/24/2018  8:33 AM  Signed  Patient presents to the clinic for establish care.    Chief Complaint   Patient presents with     Establish Care       Initial There were no vitals taken for this visit. Estimated body mass index is 29.28 kg/(m^2) as calculated from the following:    Height as of 5/18/18: 5' 8.5\" (1.74 m).    Weight as of 5/18/18: 195 lb 6.4 oz (88.6 kg).  Medication Reconciliation: complete    Kerline Lopez LPN    Nursing note reviewed with patient.  Accurracy and completeness verified.   Mr. Larsen is a 58 year old male who:  Patient presents with:  Establish Care    HPI     ICD-10-CM    1. Vomiting, intractability of vomiting not specified, presence of nausea not specified, unspecified vomiting type R11.10 GASTROENTEROLOGY ADULT REF CONSULT ONLY   2. S/P Nissen fundoplication (without gastrostomy tube) procedure Z98.890 GASTROENTEROLOGY ADULT REF CONSULT ONLY   3. Benign essential hypertension I10 lisinopril (PRINIVIL/ZESTRIL) 20 MG tablet   4. S/P laparoscopic cholecystectomy Z90.49 GASTROENTEROLOGY ADULT REF CONSULT ONLY   5. History of repair of hiatal hernia Z98.890 GASTROENTEROLOGY ADULT REF CONSULT ONLY    Z87.19    6. Hepatic steatosis K76.0 GASTROENTEROLOGY ADULT REF CONSULT ONLY   7. Heartburn R12 omeprazole (PRILOSEC) 20 MG CR capsule     Patient presents to establish care.    He has been having issues with intermittent vomiting since approximately April 20, 2018.  Once or twice a year prior to this he had some vomiting at times but nothing is been like his recent symptoms.  He had some additional workup around the time of starting these issues which included evaluation of his gallbladder and a barium upper GI swallow.  Ultrasound showed gallstone.  He ended up having laparoscopic cholecystectomy done by 1 of the local surgeons.  He also had ventral hernia repair.  Patient has history of Nissen fundoplication.  Upper GI did not show much of any " abnormality other than a slight delay in emptying of the distal esophagus.  No small bowel follow-through was performed but the initial duodenal sweep was clear.    Patient states that he will get anywhere from a couple minutes warning up to maybe 10 or 15 minutes warning of nausea prior to vomiting.  This will happen relatively spontaneously.  He does drink alcohol regularly in the evening.  We did talk about reducing his alcohol consumption as one potential help.  He has been on Reglan now for the past 2-4 weeks with minimal improvement.    States that he has been trying to eat smaller meals.  He is not sure if it is a particular kind of food or just a volume of food that is in the stomach that causes the vomiting.  A couple days ago he vomited up some coffee after just having some coffee in the morning.  Reports that oftentimes after vomiting, sometimes he will vomit 6 or 8 times, and then he will be good for 4-5 days.  Thinks that symptoms have maybe improved a little bit since his gallbladder surgery but it is still causing a lot of limitations in his planning and activities.    He is hoping to ride his motorcycle down to the [employer] [employer] again in September but states he might skip this because he does not want to vomit in his helmet.    Advised he probably needs advanced GI procedures or consultation.  These are not available at [facility].  Discussed possibility of [facility] in [place] versus [facility] of the [facility].  He will talk with scheduling and try to get arrangements made.    Hypertension, currently well controlled.  Tolerating medication.  Refill lisinopril.    Hepatic steatosis, noted on recent ultrasound.    Heartburn, still intermittently using some omeprazole.  Wants refills today.    We did not refill Reglan but states that he probably has another 2 week supply just recently sent to the pharmacy from his local surgeon.    Functional Capacity: > 4 METS.   Reports that he can  climb a flight of stairs without any chest pain/heaviness or shortness of breath.   No orthopnea/paroxysmal nocturnal dyspnea  Review of Systems   Constitutional: Negative for chills, fatigue and fever.   HENT: Negative for congestion and hearing loss.    Eyes: Negative for pain and visual disturbance.   Respiratory: Negative for cough, shortness of breath and wheezing.    Cardiovascular: Negative for chest pain and palpitations.   Gastrointestinal: Positive for diarrhea, nausea and vomiting. Negative for abdominal distention, abdominal pain, blood in stool, constipation and rectal pain.        Intermittent, but persistent nausea / vomiting since 4/20/2018.     Runnier stools by evening -- 60-70% of the time.    Endocrine: Negative for cold intolerance and heat intolerance.   Genitourinary: Negative for dysuria and hematuria.   Musculoskeletal: Negative for arthralgias and myalgias.   Skin: Negative for pallor.        Hx Melanoma   Allergic/Immunologic: Negative for immunocompromised state.   Neurological: Negative for dizziness and light-headedness.   Hematological: Does not bruise/bleed easily.   Psychiatric/Behavioral: Negative for agitation and confusion.      KARINA:   KARINA-7 SCORE 8/24/2018   Total Score 0     PHQ9:  PHQ-9 SCORE 10/18/2016 1/3/2017 8/24/2018   Total Score 0 0 0       I have personally reviewed the past medical history, past surgical history, medications, allergies, family and social history as listed below, on 8/24/2018.    Allergies   Allergen Reactions     Sulfamethoxazole-Trimethoprim Rash       Current Outpatient Prescriptions   Medication Sig Dispense Refill     lisinopril (PRINIVIL/ZESTRIL) 20 MG tablet Take 1 tablet (20 mg) by mouth daily 90 tablet 3     metoclopramide (REGLAN) 10 MG tablet Take 1 tablet by mouth 3 times daily  0     omeprazole (PRILOSEC) 20 MG CR capsule Take 1 capsule (20 mg) by mouth daily Take 30 to 60 minutes prior to 1st meal of the day 90 capsule 3     [DISCONTINUED]  lisinopril (PRINIVIL/ZESTRIL) 20 MG tablet TAKE 1 TABLET BY MOUTH ONCE DAILY. 90 tablet 3        Patient Active Problem List    Diagnosis Date Noted     S/P laparoscopic cholecystectomy 08/24/2018     Priority: Medium     Hepatic steatosis 08/24/2018     Priority: Medium     Vomiting, intractability of vomiting not specified, presence of nausea not specified, unspecified vomiting type 05/18/2018     Priority: Medium     History of repair of hiatal hernia 05/18/2018     Priority: Medium     S/P Nissen fundoplication (without gastrostomy tube) procedure 05/18/2018     Priority: Medium     Eczema, unspecified type 05/18/2018     Priority: Medium     Benign essential hypertension 05/18/2018     Priority: Medium     Melanoma in situ of left upper extremity (H) 05/18/2018     Priority: Medium     History of cold sores 05/18/2018     Priority: Medium     Past Medical History:   Diagnosis Date     Herpesviral vesicular dermatitis     9/27/2013     Past Surgical History:   Procedure Laterality Date     COLONOSCOPY      Dr Felix, 2015, due 2020     MELANOMA GREATER THAN AJCC STAGE 0  OR 1A      melanoma insitu     NISSEN FUNDOPLICATION  2000    with hiatal hernia repair     Social History     Social History     Marital status: Single     Spouse name: N/A     Number of children: N/A     Years of education: N/A     Social History Main Topics     Smoking status: Never Smoker     Smokeless tobacco: Never Used     Alcohol use Yes      Comment: whiskey-3/4 drinks nightly     Drug use: No     Sexual activity: Not Asked     Other Topics Concern     None     Social History Narrative    Retired from the [employer]  likes to go fishing     Family History   Problem Relation Age of Onset     Colon Cancer Father      Cancer-colon,62     Cancer Paternal Uncle      Cancer,80 leukemia     No Known Problems Mother      Cerebrovascular Disease Maternal Grandmother 75     Anesthesia Reaction No family hx of      CLOTTING DISORDER No family hx  "of        EXAM:   Vitals:    08/24/18 0821   BP: 114/70   BP Location: Right arm   Patient Position: Sitting   Cuff Size: Adult Regular   Pulse: 80   Weight: 195 lb 4 oz (88.6 kg)       Current Pain Score: No Pain (0)     BP Readings from Last 3 Encounters:   08/24/18 114/70   05/18/18 130/84   04/24/18 132/90    Wt Readings from Last 3 Encounters:   08/24/18 195 lb 4 oz (88.6 kg)   05/18/18 195 lb 6.4 oz (88.6 kg)   04/24/18 194 lb 4 oz (88.1 kg)      Estimated body mass index is 29.26 kg/(m^2) as calculated from the following:    Height as of 5/18/18: 5' 8.5\" (1.74 m).    Weight as of this encounter: 195 lb 4 oz (88.6 kg).     Physical Exam   Constitutional: He appears well-developed and well-nourished. No distress.   HENT:   Head: Normocephalic and atraumatic.   Eyes: Conjunctivae are normal. No scleral icterus.   Neck: No thyromegaly present.   Cardiovascular: Normal rate and regular rhythm.    No carotid Bruits   Pulmonary/Chest: Effort normal. No respiratory distress. He has no wheezes.   Abdominal: Soft. There is no tenderness.   Musculoskeletal: Normal range of motion. He exhibits no tenderness or deformity.   Lymphadenopathy:     He has no cervical adenopathy.   Neurological: He is alert. No cranial nerve deficit.   Skin: Skin is warm and dry.   Psychiatric: He has a normal mood and affect.      INVESTIGATIONS:  Results for orders placed or performed during the hospital encounter of 05/31/18   XR Upper GI    Narrative    PROCEDURE: XR UPPER GI WITHOUT KUB    HISTORY:  Coughing; Gagging episode; Weight loss.    TECHNIQUE: The patient was given effervescent crystals. The patient  then drank barium and multiple fluoroscopic images were obtained.  Fluoroscopy time was .75.    COMPARISON: None.    FINDINGS: There are postoperative changes from prior Nissen  fundoplication. No recurrent hernia or gastroesophageal reflux is  seen. There is slight delay in clearance of contrast from the distal  esophagus, but no " persistent stricture. The caliber and mucosal  pattern of the distal esophagus is unremarkable. No esophageal mass.    Mucosal pattern of the stomach is unremarkable. No gastric mass or  ulcer is seen. Duodenal sweep is unremarkable.      Impression    IMPRESSION:  Postoperative changes from prior Nissen fundoplication.  No recurrent hernia or reflux. Otherwise unremarkable upper GI.    SEEMA DAVIES MD       ASSESSMENT AND PLAN:  Problem List Items Addressed This Visit        Digestive    Hepatic steatosis    Relevant Orders    GASTROENTEROLOGY ADULT REF CONSULT ONLY       Circulatory    Benign essential hypertension    Relevant Medications    lisinopril (PRINIVIL/ZESTRIL) 20 MG tablet       Other    Vomiting, intractability of vomiting not specified, presence of nausea not specified, unspecified vomiting type - Primary    Relevant Orders    GASTROENTEROLOGY ADULT REF CONSULT ONLY    History of repair of hiatal hernia    Relevant Orders    GASTROENTEROLOGY ADULT REF CONSULT ONLY    S/P Nissen fundoplication (without gastrostomy tube) procedure    Relevant Orders    GASTROENTEROLOGY ADULT REF CONSULT ONLY    S/P laparoscopic cholecystectomy    Relevant Orders    GASTROENTEROLOGY ADULT REF CONSULT ONLY      Other Visit Diagnoses     Heartburn        Relevant Medications    omeprazole (PRILOSEC) 20 MG CR capsule        reviewed diet, exercise and weight control  -- Expected clinical course discussed    -- Medications and their side effects discussed    Patient Instructions   Recommend advanced GI work-up / testing.     May need manometry, other testing... Recommend GI consultation.     Blood pressures are well controlled. Refills sent to pharmacy.     Return in approximately 1 year, or sooner as needed for follow-up with Dr. Patino.    Clinic : [phone]  Appointment line: [phone]      Amrit Patino MD  Internal Medicine  [facility]

## 2018-08-24 NOTE — PATIENT INSTRUCTIONS
Recommend advanced GI work-up / testing.     May need manometry, other testing... Recommend GI consultation.     Blood pressures are well controlled. Refills sent to pharmacy.     Return in approximately 1 year, or sooner as needed for follow-up with Dr. Patino.    Clinic : [phone]  Appointment line: [phone]

## 2018-08-25 ASSESSMENT — ANXIETY QUESTIONNAIRES: GAD7 TOTAL SCORE: 0

## 2018-08-25 ASSESSMENT — PATIENT HEALTH QUESTIONNAIRE - PHQ9: SUM OF ALL RESPONSES TO PHQ QUESTIONS 1-9: 0

## 2018-09-19 ENCOUNTER — TRANSFERRED RECORDS (OUTPATIENT)
Dept: HEALTH INFORMATION MANAGEMENT | Facility: OTHER | Age: 58
End: 2018-09-19

## 2018-10-15 ENCOUNTER — TRANSFERRED RECORDS (OUTPATIENT)
Dept: HEALTH INFORMATION MANAGEMENT | Facility: OTHER | Age: 58
End: 2018-10-15

## 2019-03-12 ENCOUNTER — TELEPHONE (OUTPATIENT)
Dept: INTERNAL MEDICINE | Facility: OTHER | Age: 59
End: 2019-03-12

## 2019-03-12 DIAGNOSIS — R14.0 BLOATING: ICD-10-CM

## 2019-03-12 DIAGNOSIS — R11.15 INTRACTABLE CYCLICAL VOMITING WITH NAUSEA: Primary | ICD-10-CM

## 2019-03-12 DIAGNOSIS — Z98.890 S/P NISSEN FUNDOPLICATION (WITHOUT GASTROSTOMY TUBE) PROCEDURE: ICD-10-CM

## 2019-03-12 NOTE — TELEPHONE ENCOUNTER
Patient requesting referral to GI at the [facility].  He had scheduled appointment with Dr. Patino to discuss GI issues but that will be canceled.  Sanam Orozco on 3/12/2019 at 9:11 AM

## 2019-03-20 ENCOUNTER — TRANSFERRED RECORDS (OUTPATIENT)
Dept: HEALTH INFORMATION MANAGEMENT | Facility: OTHER | Age: 59
End: 2019-03-20

## 2019-03-21 ENCOUNTER — TRANSFERRED RECORDS (OUTPATIENT)
Dept: HEALTH INFORMATION MANAGEMENT | Facility: OTHER | Age: 59
End: 2019-03-21

## 2019-04-16 ENCOUNTER — TELEPHONE (OUTPATIENT)
Dept: INTERNAL MEDICINE | Facility: OTHER | Age: 59
End: 2019-04-16

## 2019-04-16 NOTE — TELEPHONE ENCOUNTER
Contacted the patient and gave them the information below. They were transferred to scheduling to make a appointment.  Yazmin Stein LPN on 4/16/2019 at 9:48 AM

## 2019-04-16 NOTE — TELEPHONE ENCOUNTER
"Patient states that he is at home recovering from his recent surgery that was done at the [facility] by Dr. Bermeo. He was given a rx for Oxycodone 5 mg tablets (see surgery encounter for more information) and he tells me when he was at the [facility] that he was to contact them if he needed more of this medication. Patient states has one pill left and has contacted the [facility]. He tells me that they are mailing him \"a form\" and he is not even sure what the form entails, but states he thinks that this is to get this refilled. He says that there was a mix-up and he was told to contact his PCP for any further refills. Has checked with both the [facility] and pharmacy. Patient was told that any pain medication refills will need an office visit, but he is requesting this message to be sent to PCP. I did not kaushal anything up yet.  Please advise   Kait Pickard LPN...................4/16/2019   9:10 AM       "

## 2019-05-07 ENCOUNTER — HOSPITAL ENCOUNTER (OUTPATIENT)
Dept: GENERAL RADIOLOGY | Facility: OTHER | Age: 59
Discharge: HOME | End: 2019-05-07
Admitting: FAMILY MEDICINE
Payer: COMMERCIAL

## 2019-05-07 DIAGNOSIS — K21.9 GASTROESOPHAGEAL REFLUX DISEASE WITHOUT ESOPHAGITIS: ICD-10-CM

## 2019-05-07 PROCEDURE — 71046 X-RAY EXAM CHEST 2 VIEWS: CPT

## 2019-09-06 DIAGNOSIS — I10 BENIGN ESSENTIAL HYPERTENSION: ICD-10-CM

## 2019-09-06 NOTE — LETTER
September 9, 2019      Janell Larsen  [id number]  [facility] [address]        A refill request was received from your pharmacy for Lisinopril.    Additional refills require an office visit with Dr. Patino for annual review and labs.    Please call [phone] to schedule appointment.      Sincerely,      Refill Nurse

## 2019-09-09 NOTE — TELEPHONE ENCOUNTER
Routing refill request to provider for review/approval because:   Blood pressure under 140/90 in past 12 months    Recent (12 mo) or future (30 days) visit within the authorizing provider's specialty    Normal serum creatinine on file in past 12 months    Normal serum potassium on file in past 12 months     LOV 8/24/18  Patient due for annual review and labs  Letter sent    Abena Ortiz RN on 9/9/2019 at 3:49 PM

## 2019-09-10 RX ORDER — LISINOPRIL 20 MG/1
TABLET ORAL
Qty: 90 TABLET | Refills: 3 | Status: SHIPPED | OUTPATIENT
Start: 2019-09-10 | End: 2020-06-08

## 2020-06-08 ENCOUNTER — OFFICE VISIT (OUTPATIENT)
Dept: PEDIATRICS | Facility: OTHER | Age: 60
End: 2020-06-08
Attending: INTERNAL MEDICINE
Payer: COMMERCIAL

## 2020-06-08 VITALS
HEIGHT: 68 IN | RESPIRATION RATE: 18 BRPM | WEIGHT: 184.3 LBS | SYSTOLIC BLOOD PRESSURE: 136 MMHG | OXYGEN SATURATION: 95 % | BODY MASS INDEX: 27.93 KG/M2 | TEMPERATURE: 97.5 F | DIASTOLIC BLOOD PRESSURE: 92 MMHG | HEART RATE: 106 BPM

## 2020-06-08 DIAGNOSIS — M70.21 OLECRANON BURSITIS OF RIGHT ELBOW: Primary | ICD-10-CM

## 2020-06-08 DIAGNOSIS — I10 BENIGN ESSENTIAL HYPERTENSION: ICD-10-CM

## 2020-06-08 DIAGNOSIS — R10.32 DEEP LEFT INGUINAL PAIN: ICD-10-CM

## 2020-06-08 PROCEDURE — 99214 OFFICE O/P EST MOD 30 MIN: CPT | Performed by: INTERNAL MEDICINE

## 2020-06-08 RX ORDER — CEPHALEXIN 500 MG/1
500 CAPSULE ORAL 3 TIMES DAILY
Qty: 30 CAPSULE | Refills: 0 | Status: SHIPPED | OUTPATIENT
Start: 2020-06-08 | End: 2020-06-08

## 2020-06-08 RX ORDER — CEPHALEXIN 500 MG/1
500 CAPSULE ORAL 3 TIMES DAILY
Qty: 42 CAPSULE | Refills: 0 | Status: SHIPPED | OUTPATIENT
Start: 2020-06-08 | End: 2020-07-31

## 2020-06-08 RX ORDER — LISINOPRIL 20 MG/1
20 TABLET ORAL DAILY
Qty: 90 TABLET | Refills: 0 | Status: SHIPPED | OUTPATIENT
Start: 2020-06-08 | End: 2020-09-01

## 2020-06-08 ASSESSMENT — MIFFLIN-ST. JEOR: SCORE: 1606.51

## 2020-06-08 ASSESSMENT — PAIN SCALES - GENERAL: PAINLEVEL: EXTREME PAIN (8)

## 2020-06-08 NOTE — NURSING NOTE
"Chief Complaint   Patient presents with     Elbow Pain     Right      Groin Pain     Left      Patient is here for elbow pain. He fell in the woods while cutting wood and then fell over his dog in the garage. Scuffed right elbow. He states he may also possibly have a hernia. He is experiencing groin pain on the left side. He did have hiatal hernia surgery back in 2000. It came back and he had surgery done again 4/8/2019.     Initial BP (!) 144/100   Pulse 106   Temp 97.5  F (36.4  C) (Tympanic)   Resp 18   Ht 1.721 m (5' 7.75\")   Wt 83.6 kg (184 lb 4.8 oz)   SpO2 95%   BMI 28.23 kg/m   Estimated body mass index is 28.23 kg/m  as calculated from the following:    Height as of this encounter: 1.721 m (5' 7.75\").    Weight as of this encounter: 83.6 kg (184 lb 4.8 oz).  Medication Reconciliation: complete    Meghan Nova MA  "

## 2020-06-08 NOTE — PROGRESS NOTES
"Subjective  Janell Larsen is a 60 year old male who presents for right elbow pain.  About 5 weeks ago he was cutting wood out in the woods when he tripped on a branch falling onto his right elbow.  It was feeling a little bit better but last week he fell in the garage after he tripped on the dog and fell onto his right elbow.  He is been trying ice but is not going away.  He has a history of hypertension which has been well controlled on lisinopril.  Home blood pressure is 120/70.  He wants me to refill his lisinopril.  He thinks he may have a hernia.  He has a history of a hiatal hernia and periumbilical hernia.  He said some discomfort in the left upper groin area.  No bulges.    Problem List/PMH: reviewed in EMR, and made relevant updates today.  Medications: reviewed in EMR, and made relevant updates today.  Allergies: reviewed in EMR, and made relevant updates today.    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    Family Hx:   Family History   Problem Relation Age of Onset     Colon Cancer Father         Cancer-colon,62     Cancer Paternal Uncle         Cancer,80 leukemia     No Known Problems Mother      Cerebrovascular Disease Maternal Grandmother 75     Anesthesia Reaction No family hx of      Clotting Disorder No family hx of        Objective  Vitals: reviewed in EMR.  BP (!) 136/92   Pulse 106   Temp 97.5  F (36.4  C) (Tympanic)   Resp 18   Ht 1.721 m (5' 7.75\")   Wt 83.6 kg (184 lb 4.8 oz)   SpO2 95%   BMI 28.23 kg/m      Gen: Pleasant male, NAD.  HEENT: MMM  Neck: Supple  Pulm: Breathing easily  Neuro: Grossly intact  Skin: Erythema of the right elbow over the " olecranon as identified in the photo below  Psychiatric: Normal affect and insight. Does not appear anxious or depressed.  : No hernias palpable.            Assessment    ICD-10-CM    1. Olecranon bursitis of right elbow  M70.21 cephALEXin (KEFLEX) 500 MG capsule     DISCONTINUED: cephALEXin (KEFLEX) 500 MG capsule   2. Benign essential hypertension  I10 lisinopril (ZESTRIL) 20 MG tablet   3. Deep left inguinal pain  R10.32      He appears to have an olecranon bursitis which is likely traumatic in origin from his 2 falls.  It sounds as though the falls are simply bad luck.  It does not sound as though there is any pattern of increased frequency of falls.  I am worried about the possibility of infection given the small area of abrasion and progression.  He is going to be gone on a fishing trip for about a week and so a 2-week course of antibiotics was prescribed however warning signs discussed and prompt repeat evaluation recommended if symptoms worsen.  I do not feel a hernia.  Differential diagnosis includes small inguinal hernia, abdominal wall sprain or strain, constipation, others.  Warning signs discussed.    Plan   -- Expected clinical course discussed   -- Medications and their side effects discussed  Patient Instructions    -- Keflex   -- Eat yogurt 1-2 times per day while on antibiotics (and for a few weeks after) to reduce the chances of diarrhea   -- Return if worse, or if not gone by end of antibiotics    Patient Education     Bursitis of the Elbow (Olecranon)  Your elbow joint contains a small fluid-filled sac called a bursa. The bursa helps the muscles and tendons move smoothly over the bone. It also cushions and protects your elbow. Bursitis is when the bursa is inflamed or swollen. This is most often due to overuse of or injury to the elbow. Symptoms include swelling and pain. If the elbow is red and feels warm to the touch, the bursa itself may be infected.  In most cases, elbow bursitis resolves  with medicine and self-care at home. It may take several weeks for the bursa to heal and the swelling to go away. In some cases, your healthcare provider may drain excess fluid from the bursa. Or, he or she may inject medicine directly into the bursa to help relieve symptoms. In severe cases, you may need surgery to remove the bursa may. If there is concern that the bursa is infected, your healthcare provider may prescribe antibiotics to treat the infection.    Home care  Your healthcare provider may prescribe medicine to help relieve pain and swelling. This may be an over-the-counter pain reliever or prescription pain medicine. Take all medicines as directed. To help treat or prevent infection, your provider may prescribe antibiotics. If these are prescribed, take them as directed until they are gone.  The following are general care guidelines:    Apply an ice pack or bag of frozen peas wrapped in a thin towel to your elbow for 15 to 20 minutes at a time. Do this 3 to 4 times a day until pain and swelling improve.    Keep your elbow raised above the level of your heart whenever possible. This helps reduce swelling. When sitting or lying down, place your arm on a pillow that rests on your chest or on a pillow at your side.    Use an elastic wrap around the elbow joint to compress the area while it is healing. Make the wrap snug but not tight to the point of causing pain.    Rest your elbow to give it time to heal. You may need to wear an elbow pad to help protect and limit the movement of your elbow. During and after healing, avoid leaning on your elbows.  Follow-up care  Follow up with your healthcare provider, or as advised. If you have been referred to a specialist, make that appointment promptly.  When to seek medical advice  Call your healthcare provider right away if any of these occur:    Fever of 100.4 F (38 C) or higher, or as advised    Chills    Increased pain, swelling, warmth, redness, or drainage from  the joint    Trouble moving the elbow joint    Numbness or tingling in the hand    Severe pain or swelling in forearm or hand    Loss of pink color and slow return of color after squeezing fingertip or hand  Date Last Reviewed: 6/1/2016 2000-2019 [employer]. [address] [place] [address]. All rights reserved. This information is not intended as a substitute for professional medical care. Always follow your healthcare professional's instructions.               No follow-ups on file.    Signed, Trey Farley MD, FAAP, FACP  Internal Medicine & Pediatrics

## 2020-06-08 NOTE — PATIENT INSTRUCTIONS
-- Keflex   -- Eat yogurt 1-2 times per day while on antibiotics (and for a few weeks after) to reduce the chances of diarrhea   -- Return if worse, or if not gone by end of antibiotics    Patient Education     Bursitis of the Elbow (Olecranon)  Your elbow joint contains a small fluid-filled sac called a bursa. The bursa helps the muscles and tendons move smoothly over the bone. It also cushions and protects your elbow. Bursitis is when the bursa is inflamed or swollen. This is most often due to overuse of or injury to the elbow. Symptoms include swelling and pain. If the elbow is red and feels warm to the touch, the bursa itself may be infected.  In most cases, elbow bursitis resolves with medicine and self-care at home. It may take several weeks for the bursa to heal and the swelling to go away. In some cases, your healthcare provider may drain excess fluid from the bursa. Or, he or she may inject medicine directly into the bursa to help relieve symptoms. In severe cases, you may need surgery to remove the bursa may. If there is concern that the bursa is infected, your healthcare provider may prescribe antibiotics to treat the infection.    Home care  Your healthcare provider may prescribe medicine to help relieve pain and swelling. This may be an over-the-counter pain reliever or prescription pain medicine. Take all medicines as directed. To help treat or prevent infection, your provider may prescribe antibiotics. If these are prescribed, take them as directed until they are gone.  The following are general care guidelines:    Apply an ice pack or bag of frozen peas wrapped in a thin towel to your elbow for 15 to 20 minutes at a time. Do this 3 to 4 times a day until pain and swelling improve.    Keep your elbow raised above the level of your heart whenever possible. This helps reduce swelling. When sitting or lying down, place your arm on a pillow that rests on your chest or on a pillow at your side.    Use an  elastic wrap around the elbow joint to compress the area while it is healing. Make the wrap snug but not tight to the point of causing pain.    Rest your elbow to give it time to heal. You may need to wear an elbow pad to help protect and limit the movement of your elbow. During and after healing, avoid leaning on your elbows.  Follow-up care  Follow up with your healthcare provider, or as advised. If you have been referred to a specialist, make that appointment promptly.  When to seek medical advice  Call your healthcare provider right away if any of these occur:    Fever of 100.4 F (38 C) or higher, or as advised    Chills    Increased pain, swelling, warmth, redness, or drainage from the joint    Trouble moving the elbow joint    Numbness or tingling in the hand    Severe pain or swelling in forearm or hand    Loss of pink color and slow return of color after squeezing fingertip or hand  Date Last Reviewed: 6/1/2016 2000-2019 [employer]. [address] [place] [address], [place] [address]. All rights reserved. This information is not intended as a substitute for professional medical care. Always follow your healthcare professional's instructions.

## 2020-07-31 ENCOUNTER — OFFICE VISIT (OUTPATIENT)
Dept: PEDIATRICS | Facility: OTHER | Age: 60
End: 2020-07-31
Attending: INTERNAL MEDICINE
Payer: COMMERCIAL

## 2020-07-31 VITALS
RESPIRATION RATE: 16 BRPM | HEIGHT: 68 IN | DIASTOLIC BLOOD PRESSURE: 98 MMHG | BODY MASS INDEX: 27.93 KG/M2 | OXYGEN SATURATION: 93 % | WEIGHT: 184.3 LBS | HEART RATE: 80 BPM | TEMPERATURE: 98 F | SYSTOLIC BLOOD PRESSURE: 136 MMHG

## 2020-07-31 DIAGNOSIS — Z01.818 PREOP EXAMINATION: Primary | ICD-10-CM

## 2020-07-31 DIAGNOSIS — D64.9 ANEMIA, UNSPECIFIED TYPE: ICD-10-CM

## 2020-07-31 DIAGNOSIS — Z13.220 LIPID SCREENING: ICD-10-CM

## 2020-07-31 DIAGNOSIS — Z11.59 NEED FOR HEPATITIS C SCREENING TEST: ICD-10-CM

## 2020-07-31 DIAGNOSIS — Z13.1 SCREENING FOR DIABETES MELLITUS: ICD-10-CM

## 2020-07-31 DIAGNOSIS — R74.01 ELEVATED AST (SGOT): ICD-10-CM

## 2020-07-31 DIAGNOSIS — I83.93 VARICOSE VEINS OF BOTH LOWER EXTREMITIES, UNSPECIFIED WHETHER COMPLICATED: ICD-10-CM

## 2020-07-31 DIAGNOSIS — I10 BENIGN ESSENTIAL HYPERTENSION: ICD-10-CM

## 2020-07-31 LAB
ALBUMIN SERPL-MCNC: 4.1 G/DL (ref 3.5–5.7)
ALP SERPL-CCNC: 62 U/L (ref 34–104)
ALT SERPL W P-5'-P-CCNC: 30 U/L (ref 7–52)
ANION GAP SERPL CALCULATED.3IONS-SCNC: 7 MMOL/L (ref 3–14)
AST SERPL W P-5'-P-CCNC: 30 U/L (ref 13–39)
BILIRUB SERPL-MCNC: 0.5 MG/DL (ref 0.3–1)
BUN SERPL-MCNC: 13 MG/DL (ref 7–25)
CALCIUM SERPL-MCNC: 9.3 MG/DL (ref 8.6–10.3)
CHLORIDE SERPL-SCNC: 109 MMOL/L (ref 98–107)
CHOLEST SERPL-MCNC: 188 MG/DL
CO2 SERPL-SCNC: 23 MMOL/L (ref 21–31)
CREAT SERPL-MCNC: 1.02 MG/DL (ref 0.7–1.3)
ERYTHROCYTE [DISTWIDTH] IN BLOOD BY AUTOMATED COUNT: 13.4 % (ref 10–15)
GFR SERPL CREATININE-BSD FRML MDRD: 74 ML/MIN/{1.73_M2}
GLUCOSE SERPL-MCNC: 96 MG/DL (ref 70–105)
HCT VFR BLD AUTO: 50.9 % (ref 40–53)
HDLC SERPL-MCNC: 59 MG/DL (ref 23–92)
HGB BLD-MCNC: 17.2 G/DL (ref 13.3–17.7)
LDLC SERPL CALC-MCNC: 112 MG/DL
MCH RBC QN AUTO: 32.5 PG (ref 26.5–33)
MCHC RBC AUTO-ENTMCNC: 33.8 G/DL (ref 31.5–36.5)
MCV RBC AUTO: 96 FL (ref 78–100)
NONHDLC SERPL-MCNC: 129 MG/DL
PLATELET # BLD AUTO: 210 10E9/L (ref 150–450)
POTASSIUM SERPL-SCNC: 4.8 MMOL/L (ref 3.5–5.1)
PROT SERPL-MCNC: 7.7 G/DL (ref 6.4–8.9)
RBC # BLD AUTO: 5.29 10E12/L (ref 4.4–5.9)
SODIUM SERPL-SCNC: 139 MMOL/L (ref 134–144)
TRIGL SERPL-MCNC: 86 MG/DL
WBC # BLD AUTO: 6.4 10E9/L (ref 4–11)

## 2020-07-31 PROCEDURE — 85027 COMPLETE CBC AUTOMATED: CPT | Mod: ZL | Performed by: INTERNAL MEDICINE

## 2020-07-31 PROCEDURE — 86803 HEPATITIS C AB TEST: CPT | Mod: ZL | Performed by: INTERNAL MEDICINE

## 2020-07-31 PROCEDURE — 80053 COMPREHEN METABOLIC PANEL: CPT | Mod: ZL | Performed by: INTERNAL MEDICINE

## 2020-07-31 PROCEDURE — 80061 LIPID PANEL: CPT | Mod: ZL | Performed by: INTERNAL MEDICINE

## 2020-07-31 PROCEDURE — 36415 COLL VENOUS BLD VENIPUNCTURE: CPT | Mod: ZL | Performed by: INTERNAL MEDICINE

## 2020-07-31 PROCEDURE — 99214 OFFICE O/P EST MOD 30 MIN: CPT | Performed by: INTERNAL MEDICINE

## 2020-07-31 ASSESSMENT — PAIN SCALES - GENERAL: PAINLEVEL: NO PAIN (0)

## 2020-07-31 ASSESSMENT — MIFFLIN-ST. JEOR: SCORE: 1606.51

## 2020-07-31 NOTE — PROGRESS NOTES
"Date of Surgery: 8/13/2020  Type of Surgery: Colonoscopy   Surgeon: Dr. Felix   Hospital:  [facility]       Fever/Chills or other infectious symptoms in past month: no  >10lb weight loss in past two months: no  O2 SAT: 93    Health Care Directive/Code status: No   Hx of blood transfusions:   no  Td up to date:  yes  History of VRE/MRSA:  no     Preoperative Evaluation: Obstructive Sleep Apnea screening    S: Snore -  Do you snore loudly? (louder than talking or loud enough to be heard through closed doors) unknown- single   T: Tired - Do you often feel tired, fatigued, or sleepy during the daytime?no  O: Observed - Has anyone ever observed you stop breathing during your sleep? No   P: Pressure - Do you have or are you being treated for high blood pressure?yes  B: BMI - BMI greater than 35kg/m2?no  A: Age - Age over 50 years old?yes  N: Neck - Neck circumference greater than 40 cm?no  G: Gender - Gender: Male?yes    Total number of \"YES\" responses:  3    Scoring: Low risk of ERICKSON 0-2  At Risk of ERICKSON: >3 High Risk of ERICKSON: 5-8    Meghan Nova MA 7/31/2020 9:18 AM    "

## 2020-07-31 NOTE — LETTER
August 10, 2020      Janell Larsen  [address] [address] [facility] [address] [place] [address]        Dear ,    We are writing to inform you of your test results.    Great news, you do not have hepatitis C. Other labs look good.    Signed, Trey Farley MD, FAAP, FACP  Internal Medicine & Pediatrics      Resulted Orders   Comprehensive metabolic panel   Result Value Ref Range    Sodium 139 134 - 144 mmol/L    Potassium 4.8 3.5 - 5.1 mmol/L    Chloride 109 (H) 98 - 107 mmol/L    Carbon Dioxide 23 21 - 31 mmol/L    Anion Gap 7 3 - 14 mmol/L    Glucose 96 70 - 105 mg/dL    Urea Nitrogen 13 7 - 25 mg/dL    Creatinine 1.02 0.70 - 1.30 mg/dL    GFR Estimate 74 >60 mL/min/[1.73_m2]    GFR Estimate If Black 90 >60 mL/min/[1.73_m2]    Calcium 9.3 8.6 - 10.3 mg/dL    Bilirubin Total 0.5 0.3 - 1.0 mg/dL    Albumin 4.1 3.5 - 5.7 g/dL    Protein Total 7.7 6.4 - 8.9 g/dL    Alkaline Phosphatase 62 34 - 104 U/L    ALT 30 7 - 52 U/L    AST 30 13 - 39 U/L   Lipid Profile   Result Value Ref Range    Cholesterol 188 <200 mg/dL    Triglycerides 86 <150 mg/dL    HDL Cholesterol 59 23 - 92 mg/dL    LDL Cholesterol Calculated 112 (H) <100 mg/dL      Comment:      Above desirable:  100-129 mg/dl  Borderline High:  130-159 mg/dL  High:             160-189 mg/dL  Very high:       >189 mg/dl      Non HDL Cholesterol 129 <130 mg/dL   CBC with platelets   Result Value Ref Range    WBC 6.4 4.0 - 11.0 10e9/L    RBC Count 5.29 4.4 - 5.9 10e12/L    Hemoglobin 17.2 13.3 - 17.7 g/dL    Hematocrit 50.9 40.0 - 53.0 %    MCV 96 78 - 100 fl    MCH 32.5 26.5 - 33.0 pg    MCHC 33.8 31.5 - 36.5 g/dL    RDW 13.4 10.0 - 15.0 %    Platelet Count 210 150 - 450 10e9/L   Hepatitis C Screen Reflex to HCV RNA Quant and Genotype   Result Value Ref Range    Hepatitis C Antibody Nonreactive NR^Nonreactive      Comment:      Assay performance characteristics have not been established for newborns,   infants, and children         If you have any questions or concerns,  please call the clinic at the number listed above.       Sincerely,        Trey Farley MD

## 2020-07-31 NOTE — PATIENT INSTRUCTIONS
-- Consider trial of cholestyramine for diarrhea   -- Hold aspirin for 5-7 days before surgery, unless you have had a stent then continue aspirin.   -- Hold ibuprofen/Advil for 2-3 days before surgery   -- Hold naproxen/Aleve for 5-7 days before surgery   -- Acetaminophen (Tylenol) is okay   -- Hold vitamins and herbal remedies for 7 days before surgery    Patient Education     Cholestyramine Powder for Oral suspension  What is this medicine?  CHOLESTYRAMINE (koe LESS tir a meen) is used to lower cholesterol in patients who are at risk of heart disease or stroke. This medicine is only for patients whose cholesterol level is not controlled by diet.  This medicine may be used for other purposes; ask your health care provider or pharmacist if you have questions.  What should I tell my health care provider before I take this medicine?  They need to know if you have any of these conditions:    blocked bile duct    an unusual or allergic reaction to cholestyramine, other medicines, foods, dyes, or preservatives    pregnant or trying to get pregnant    breast-feeding  How should I use this medicine?  Do not take this medicine in the dry form. It must be mixed with a liquid before swallowing. Follow the directions on the prescription label. Place the powder in a glass or cup. Add between 2 and 6 ounces of fluid. This can be water, milk, pulpy fruit juice, fluid soup, or other liquid. Mix well and drink all of the liquid. Take your doses at regular intervals. Do not take your medicine more often than directed.  Talk to your pediatrician regarding the use of this medicine in children. Special care may be needed.  Overdosage: If you think you have taken too much of this medicine contact a poison control center or emergency room at once.  NOTE: This medicine is only for you. Do not share this medicine with others.  What if I miss a dose?  If you miss a dose, take it as soon as you can. If it is almost time for your next dose,  take only that dose. Do not take double or extra doses.  What may interact with this medicine?    diuretics    female hormones, like estrogens or progestins and birth control pills    heart medicines such as digoxin or digitoxin    penicillin G    phenobarbital    phenylbutazone    phytonadione    propranolol    tetracycline antibiotics    thyroid hormones    vitamin A    vitamin D    vitamin E    warfarin  Take other drugs at least 1 hour before or 4 hours after this medicine, to avoid decreasing their absorption.  This list may not describe all possible interactions. Give your health care provider a list of all the medicines, herbs, non-prescription drugs, or dietary supplements you use. Also tell them if you smoke, drink alcohol, or use illegal drugs. Some items may interact with your medicine.  What should I watch for while using this medicine?  Visit your doctor or health care professional for regular checks on your progress. Your blood fats and other tests will be measured from time to time.  This medicine is only part of a total cholesterol-lowering program. Your health care professional or dietician can suggest a low-cholesterol and low-fat diet that will reduce your risk of getting heart and blood vessel disease. Avoid alcohol and smoking, and keep a proper exercise schedule.  To reduce the chance of getting constipated, drink plenty of water and increase the amount of fiber in your diet. Ask your doctor or health care professional for advice if you are constipated.  What side effects may I notice from receiving this medicine?  Side effects that you should report to your doctor or health care professional as soon as possible:    allergic reactions like skin rash, itching or hives, swelling of the face, lips, or tongue    bloody or black, tarry stools    severe stomach pain with nausea and vomiting    unusual bleeding  Side effects that usually do not require medical attention (report to your doctor or  health care professional if they continue or are bothersome):    constipation or diarrhea    dizziness    headache    heartburn, indigestion    nausea, vomiting    perianal irritation  This list may not describe all possible side effects. Call your doctor for medical advice about side effects. You may report side effects to FDA at 3-704-OUV-4921.  Where should I keep my medicine?  Keep out of the reach of children.  Store at room temperature between 15 and 30 degrees C (59 and 86 degrees F). Throw away any unused medicine after the expiration date.  NOTE:This sheet is a summary. It may not cover all possible information. If you have questions about this medicine, talk to your doctor, pharmacist, or health care provider. Copyright  2016 [employer]

## 2020-07-31 NOTE — NURSING NOTE
"Chief Complaint   Patient presents with     Pre-Op Exam     8/13/2020     Date of Surgery: 8/13/2020  Type of Surgery: Colonoscopy   Surgeon: Dr. Felix   Hospital:  [facility]       Initial BP (!) 148/108 (BP Location: Right arm, Patient Position: Sitting, Cuff Size: Adult Regular)   Pulse 80   Temp 98  F (36.7  C) (Tympanic)   Resp 16   Ht 1.721 m (5' 7.75\")   Wt 83.6 kg (184 lb 4.8 oz)   SpO2 93%   BMI 28.23 kg/m   Estimated body mass index is 28.23 kg/m  as calculated from the following:    Height as of this encounter: 1.721 m (5' 7.75\").    Weight as of this encounter: 83.6 kg (184 lb 4.8 oz).  Medication Reconciliation: complete    Meghan Nova MA  "

## 2020-07-31 NOTE — PROGRESS NOTES
"PREOPERATIVE HISTORY & PHYSICAL  Date of Exam: 7/31/2020  Chief Complaint   Patient presents with     Pre-Op Exam     8/13/2020       Nursing Notes:   Meghan Nova MA  7/31/2020  9:29 AM  Signed  Chief Complaint   Patient presents with     Pre-Op Exam     8/13/2020     Date of Surgery: 8/13/2020  Type of Surgery: Colonoscopy   Surgeon: Dr. Felix   Hospital:  [facility]       Initial BP (!) 148/108 (BP Location: Right arm, Patient Position: Sitting, Cuff Size: Adult Regular)   Pulse 80   Temp 98  F (36.7  C) (Tympanic)   Resp 16   Ht 1.721 m (5' 7.75\")   Wt 83.6 kg (184 lb 4.8 oz)   SpO2 93%   BMI 28.23 kg/m   Estimated body mass index is 28.23 kg/m  as calculated from the following:    Height as of this encounter: 1.721 m (5' 7.75\").    Weight as of this encounter: 83.6 kg (184 lb 4.8 oz).  Medication Reconciliation: complete    Meghan Nova MA    HPI:  I was asked to see Mr. Janell Larsen by Dr. Felix for preoperative management of hypertension.    Janell Larsen is a 60 year old male with a history of retention here for preoperative examination.  The most physically active he has been over the past 2 weeks was: Fishing and chores without chest pains.    Patient Active Problem List    Diagnosis Date Noted     Varicose veins of both lower extremities, unspecified whether complicated 07/31/2020     Priority: Medium     Elevated AST (SGOT) 07/31/2020     Priority: Medium     S/P laparoscopic cholecystectomy 08/24/2018     Priority: Medium     Hepatic steatosis 08/24/2018     Priority: Medium     Intractable cyclical vomiting with nausea 05/18/2018     Priority: Medium     History of repair of hiatal hernia 05/18/2018     Priority: Medium     S/P Nissen fundoplication (without gastrostomy tube) procedure 05/18/2018     Priority: Medium     Eczema, unspecified type 05/18/2018     Priority: Medium     Benign essential hypertension 05/18/2018     Priority: Medium     Melanoma in situ of left upper extremity " (H) 05/18/2018     Priority: Medium     History of cold sores 05/18/2018     Priority: Medium     Past Medical History:   Diagnosis Date     Herpesviral vesicular dermatitis     9/27/2013     Past Surgical History:   Procedure Laterality Date     COLONOSCOPY  04/25/2015    Dr Felix, 2015, due 2020     MELANOMA GREATER THAN AJCC STAGE 0  OR 1A      melanoma insitu     NISSEN FUNDOPLICATION  2000    with hiatal hernia repair     Current Outpatient Medications   Medication Sig Dispense Refill     lisinopril (ZESTRIL) 20 MG tablet Take 1 tablet (20 mg) by mouth daily 90 tablet 0     Allergies   Allergen Reactions     Sulfamethoxazole-Trimethoprim Rash     Family History   Problem Relation Age of Onset     Colon Cancer Father         Cancer-colon,62     Cancer Paternal Uncle         Cancer,80 leukemia     No Known Problems Mother      Cerebrovascular Disease Maternal Grandmother 75     Anesthesia Reaction No family hx of      Clotting Disorder No family hx of      Social History     Socioeconomic History     Marital status: Single     Spouse name: None     Number of children: None     Years of education: None     Highest education level: None   Occupational History     None   Social Needs     Financial resource strain: None     Food insecurity     Worry: None     Inability: None     Transportation needs     Medical: None     Non-medical: None   Tobacco Use     Smoking status: Never Smoker     Smokeless tobacco: Never Used   Substance and Sexual Activity     Alcohol use: Yes     Comment: chris-3/4 drinks nightly     Drug use: Never     Sexual activity: None     Comment:  to address    Lifestyle     Physical activity     Days per week: None     Minutes per session: None     Stress: None   Relationships     Social connections     Talks on phone: None     Gets together: None     Attends Zoroastrian service: None     Active member of club or organization: None     Attends meetings of clubs or organizations: None      "Relationship status: None     Intimate partner violence     Fear of current or ex partner: None     Emotionally abused: None     Physically abused: None     Forced sexual activity: None   Other Topics Concern     Parent/sibling w/ CABG, MI or angioplasty before 65F 55M? Not Asked   Social History Narrative    Retired from the power [employer]  likes to go fishing       REVIEW OF SYSTEMS:  Review of Systems:  Skin: Negative  Eyes: Negative  Ears/Nose/Throat: Negative  Respiratory: Negative  Cardiovascular: Negative  Gastrointestinal: Negative  Genitourinary: Negative  Musculoskeletal: Negative  Neurologic: Negative  Psychiatric: Negative  Hematologic/Lymphatic/Immunologic: Negative  Endocrine: Negative      EXAM:   Vitals: reviewed in EMR.  BP (!) 136/98 (BP Location: Right arm, Patient Position: Sitting, Cuff Size: Adult Regular)   Pulse 80   Temp 98  F (36.7  C) (Tympanic)   Resp 16   Ht 1.721 m (5' 7.75\")   Wt 83.6 kg (184 lb 4.8 oz)   SpO2 93%   BMI 28.23 kg/m      Gen: Pleasant male, NAD.  HEENT: MMM, no OP erythema.   Neck: Supple, no JVD, no bruits.  CV: RRR no m/r/g.   Pulm: CTAB no w/r/r  Neuro: Grossly intact  Msk: No lower extremity edema.  Varicose veins are present on the lower extremities left greater than right.  No sores or ulcerations.  Skin: No concerning lesions.  Psychiatric: Normal affect and insight. Does not appear anxious or depressed.    DIAGNOSTICS:   Results for orders placed or performed in visit on 07/31/20 (from the past 48 hour(s))   Comprehensive metabolic panel   Result Value Ref Range    Sodium 139 134 - 144 mmol/L    Potassium 4.8 3.5 - 5.1 mmol/L    Chloride 109 (H) 98 - 107 mmol/L    Carbon Dioxide 23 21 - 31 mmol/L    Anion Gap 7 3 - 14 mmol/L    Glucose 96 70 - 105 mg/dL    Urea Nitrogen 13 7 - 25 mg/dL    Creatinine 1.02 0.70 - 1.30 mg/dL    GFR Estimate 74 >60 mL/min/[1.73_m2]    GFR Estimate If Black 90 >60 mL/min/[1.73_m2]    Calcium 9.3 8.6 - 10.3 mg/dL    Bilirubin Total " 0.5 0.3 - 1.0 mg/dL    Albumin 4.1 3.5 - 5.7 g/dL    Protein Total 7.7 6.4 - 8.9 g/dL    Alkaline Phosphatase 62 34 - 104 U/L    ALT 30 7 - 52 U/L    AST 30 13 - 39 U/L   Lipid Profile   Result Value Ref Range    Cholesterol 188 <200 mg/dL    Triglycerides 86 <150 mg/dL    HDL Cholesterol 59 23 - 92 mg/dL    LDL Cholesterol Calculated 112 (H) <100 mg/dL    Non HDL Cholesterol 129 <130 mg/dL   CBC with platelets   Result Value Ref Range    WBC 6.4 4.0 - 11.0 10e9/L    RBC Count 5.29 4.4 - 5.9 10e12/L    Hemoglobin 17.2 13.3 - 17.7 g/dL    Hematocrit 50.9 40.0 - 53.0 %    MCV 96 78 - 100 fl    MCH 32.5 26.5 - 33.0 pg    MCHC 33.8 31.5 - 36.5 g/dL    RDW 13.4 10.0 - 15.0 %    Platelet Count 210 150 - 450 10e9/L         IMPRESSION:     ICD-10-CM    1. Preop examination  Z01.818    2. Benign essential hypertension  I10 CANCELED: Basic metabolic panel   3. Screening for diabetes mellitus  Z13.1 CANCELED: Basic metabolic panel   4. Anemia, unspecified type  D64.9 CBC with platelets   5. Lipid screening  Z13.220 Lipid Profile   6. Need for hepatitis C screening test  Z11.59 Hepatitis C Screen Reflex to HCV RNA Quant and Genotype   7. Elevated AST (SGOT)  R74.0 Comprehensive metabolic panel   8. Varicose veins of both lower extremities, unspecified whether complicated  I83.93        For above listed surgery and anesthesia:   Patient is at increased risk for perioperative complications based on hypertension, age.    RECOMMENDATIONS:   APPROVAL GIVEN to proceed with proposed procedure, without further diagnostic evaluation    Patient is on chronic pain medications: No  Patient is on antiplatelet/anticoagulation: No  Other medications that need adjustment perioperatively: No  Patient Instructions    -- Consider trial of cholestyramine for diarrhea   -- Hold aspirin for 5-7 days before surgery, unless you have had a stent then continue aspirin.   -- Hold ibuprofen/Advil for 2-3 days before surgery   -- Hold naproxen/Aleve for  5-7 days before surgery   -- Acetaminophen (Tylenol) is okay   -- Hold vitamins and herbal remedies for 7 days before surgery        Signed, Trey Farley MD, FAAP, FACP  Internal Medicine & Pediatrics    CC Dr. Felix

## 2020-08-03 LAB — HCV AB SERPL QL IA: NONREACTIVE

## 2020-08-10 ENCOUNTER — ALLIED HEALTH/NURSE VISIT (OUTPATIENT)
Dept: FAMILY MEDICINE | Facility: OTHER | Age: 60
End: 2020-08-10
Payer: COMMERCIAL

## 2020-08-10 DIAGNOSIS — Z01.818 PRE-OP EXAM: Primary | ICD-10-CM

## 2020-08-10 PROCEDURE — C9803 HOPD COVID-19 SPEC COLLECT: HCPCS

## 2020-08-10 PROCEDURE — U0003 INFECTIOUS AGENT DETECTION BY NUCLEIC ACID (DNA OR RNA); SEVERE ACUTE RESPIRATORY SYNDROME CORONAVIRUS 2 (SARS-COV-2) (CORONAVIRUS DISEASE [COVID-19]), AMPLIFIED PROBE TECHNIQUE, MAKING USE OF HIGH THROUGHPUT TECHNOLOGIES AS DESCRIBED BY CMS-2020-01-R: HCPCS | Mod: ZL | Performed by: FAMILY MEDICINE

## 2020-08-10 PROCEDURE — 99207 ZZC NO CHARGE NURSE ONLY: CPT

## 2020-08-11 LAB
SARS-COV-2 RNA SPEC QL NAA+PROBE: NOT DETECTED
SPECIMEN SOURCE: NORMAL

## 2020-08-13 ENCOUNTER — TRANSFERRED RECORDS (OUTPATIENT)
Dept: HEALTH INFORMATION MANAGEMENT | Facility: OTHER | Age: 60
End: 2020-08-13

## 2020-09-03 ENCOUNTER — TRANSFERRED RECORDS (OUTPATIENT)
Dept: HEALTH INFORMATION MANAGEMENT | Facility: OTHER | Age: 60
End: 2020-09-03

## 2020-09-14 ENCOUNTER — ALLIED HEALTH/NURSE VISIT (OUTPATIENT)
Dept: FAMILY MEDICINE | Facility: OTHER | Age: 60
End: 2020-09-14
Payer: COMMERCIAL

## 2020-09-14 DIAGNOSIS — Z29.9 PROPHYLACTIC MEASURE: Primary | ICD-10-CM

## 2020-09-14 PROCEDURE — C9803 HOPD COVID-19 SPEC COLLECT: HCPCS

## 2020-09-14 PROCEDURE — U0003 INFECTIOUS AGENT DETECTION BY NUCLEIC ACID (DNA OR RNA); SEVERE ACUTE RESPIRATORY SYNDROME CORONAVIRUS 2 (SARS-COV-2) (CORONAVIRUS DISEASE [COVID-19]), AMPLIFIED PROBE TECHNIQUE, MAKING USE OF HIGH THROUGHPUT TECHNOLOGIES AS DESCRIBED BY CMS-2020-01-R: HCPCS | Mod: ZL

## 2020-09-14 PROCEDURE — 99207 ZZC NO CHARGE LOS: CPT

## 2020-09-15 LAB
SARS-COV-2 RNA SPEC QL NAA+PROBE: NOT DETECTED
SPECIMEN SOURCE: NORMAL

## 2020-09-17 ENCOUNTER — TRANSFERRED RECORDS (OUTPATIENT)
Dept: HEALTH INFORMATION MANAGEMENT | Facility: OTHER | Age: 60
End: 2020-09-17

## 2020-10-25 ENCOUNTER — ALLIED HEALTH/NURSE VISIT (OUTPATIENT)
Dept: FAMILY MEDICINE | Facility: OTHER | Age: 60
End: 2020-10-25
Attending: SURGERY
Payer: COMMERCIAL

## 2020-10-25 DIAGNOSIS — Z20.822 COVID-19 RULED OUT: ICD-10-CM

## 2020-10-25 DIAGNOSIS — Z20.822 ENCOUNTER FOR LABORATORY TESTING FOR COVID-19 VIRUS: Primary | ICD-10-CM

## 2020-10-25 PROCEDURE — C9803 HOPD COVID-19 SPEC COLLECT: HCPCS

## 2020-10-25 PROCEDURE — 99207 PR NO CHARGE NURSE ONLY: CPT

## 2020-10-25 PROCEDURE — U0003 INFECTIOUS AGENT DETECTION BY NUCLEIC ACID (DNA OR RNA); SEVERE ACUTE RESPIRATORY SYNDROME CORONAVIRUS 2 (SARS-COV-2) (CORONAVIRUS DISEASE [COVID-19]), AMPLIFIED PROBE TECHNIQUE, MAKING USE OF HIGH THROUGHPUT TECHNOLOGIES AS DESCRIBED BY CMS-2020-01-R: HCPCS | Mod: ZL | Performed by: SURGERY

## 2020-10-25 NOTE — NURSING NOTE
Osteopenia.  Discussed w/ Aditi over phone yesterday. Patient swabbed for COVID-19 testing.Katty Alexandra LPN on 10/25/2020 at 9:17 AM  Surgery 10/29

## 2020-10-26 LAB
SARS-COV-2 RNA SPEC QL NAA+PROBE: NOT DETECTED
SPECIMEN SOURCE: NORMAL

## 2020-10-29 ENCOUNTER — TRANSFERRED RECORDS (OUTPATIENT)
Dept: HEALTH INFORMATION MANAGEMENT | Facility: OTHER | Age: 60
End: 2020-10-29

## 2021-04-16 ENCOUNTER — IMMUNIZATION (OUTPATIENT)
Dept: FAMILY MEDICINE | Facility: OTHER | Age: 61
End: 2021-04-16
Attending: FAMILY MEDICINE
Payer: COMMERCIAL

## 2021-04-16 PROCEDURE — 91300 PR COVID VAC PFIZER DIL RECON 30 MCG/0.3 ML IM: CPT

## 2021-04-16 PROCEDURE — 0001A PR COVID VAC PFIZER DIL RECON 30 MCG/0.3 ML IM: CPT

## 2021-05-07 ENCOUNTER — IMMUNIZATION (OUTPATIENT)
Dept: FAMILY MEDICINE | Facility: OTHER | Age: 61
End: 2021-05-07
Attending: FAMILY MEDICINE
Payer: COMMERCIAL

## 2021-05-07 PROCEDURE — 91300 PR COVID VAC PFIZER DIL RECON 30 MCG/0.3 ML IM: CPT

## 2021-05-07 PROCEDURE — 0002A PR COVID VAC PFIZER DIL RECON 30 MCG/0.3 ML IM: CPT

## 2021-05-10 ENCOUNTER — TELEPHONE (OUTPATIENT)
Dept: PEDIATRICS | Facility: OTHER | Age: 61
End: 2021-05-10

## 2021-05-10 NOTE — TELEPHONE ENCOUNTER
ACC Review   Please call  Janell BLANCHARD Suzie.     -- Schedule a nurse only BP check   -- If BP is greater than or equal to 140/90, schedule an office visit for blood pressure with Dr. Farley.     Signed, Trey Farley MD, FAAP, FACP  Internal Medicine & Pediatrics

## 2021-10-13 ENCOUNTER — OFFICE VISIT (OUTPATIENT)
Dept: INTERNAL MEDICINE | Facility: OTHER | Age: 61
End: 2021-10-13
Attending: INTERNAL MEDICINE
Payer: COMMERCIAL

## 2021-10-13 ENCOUNTER — TELEPHONE (OUTPATIENT)
Dept: INTERNAL MEDICINE | Facility: OTHER | Age: 61
End: 2021-10-13

## 2021-10-13 ENCOUNTER — HOSPITAL ENCOUNTER (OUTPATIENT)
Dept: ULTRASOUND IMAGING | Facility: OTHER | Age: 61
End: 2021-10-13
Attending: INTERNAL MEDICINE
Payer: COMMERCIAL

## 2021-10-13 VITALS
TEMPERATURE: 98.7 F | HEIGHT: 66 IN | OXYGEN SATURATION: 95 % | DIASTOLIC BLOOD PRESSURE: 84 MMHG | BODY MASS INDEX: 30.05 KG/M2 | SYSTOLIC BLOOD PRESSURE: 112 MMHG | RESPIRATION RATE: 14 BRPM | HEART RATE: 105 BPM | WEIGHT: 187 LBS

## 2021-10-13 DIAGNOSIS — Z13.1 SCREENING FOR DIABETES MELLITUS: ICD-10-CM

## 2021-10-13 DIAGNOSIS — M79.89 RIGHT LEG SWELLING: Primary | ICD-10-CM

## 2021-10-13 DIAGNOSIS — Z79.899 HIGH RISK MEDICATION USE: ICD-10-CM

## 2021-10-13 DIAGNOSIS — M79.89 RIGHT LEG SWELLING: ICD-10-CM

## 2021-10-13 DIAGNOSIS — Z12.5 SCREENING FOR PROSTATE CANCER: ICD-10-CM

## 2021-10-13 DIAGNOSIS — I82.431 ACUTE DEEP VEIN THROMBOSIS (DVT) OF POPLITEAL VEIN OF RIGHT LOWER EXTREMITY (H): ICD-10-CM

## 2021-10-13 LAB
ALBUMIN SERPL-MCNC: 4.1 G/DL (ref 3.5–5.7)
ALP SERPL-CCNC: 66 U/L (ref 34–104)
ALT SERPL W P-5'-P-CCNC: 28 U/L (ref 7–52)
ANION GAP SERPL CALCULATED.3IONS-SCNC: 8 MMOL/L (ref 3–14)
AST SERPL W P-5'-P-CCNC: 30 U/L (ref 13–39)
BILIRUB SERPL-MCNC: 0.7 MG/DL (ref 0.3–1)
BUN SERPL-MCNC: 19 MG/DL (ref 7–25)
CALCIUM SERPL-MCNC: 9.6 MG/DL (ref 8.6–10.3)
CHLORIDE BLD-SCNC: 104 MMOL/L (ref 98–107)
CO2 SERPL-SCNC: 23 MMOL/L (ref 21–31)
CREAT SERPL-MCNC: 0.95 MG/DL (ref 0.7–1.3)
ERYTHROCYTE [DISTWIDTH] IN BLOOD BY AUTOMATED COUNT: 13.5 % (ref 10–15)
GFR SERPL CREATININE-BSD FRML MDRD: 85 ML/MIN/1.73M2
GLUCOSE BLD-MCNC: 95 MG/DL (ref 70–105)
HCT VFR BLD AUTO: 47.9 % (ref 40–53)
HGB BLD-MCNC: 16.3 G/DL (ref 13.3–17.7)
MCH RBC QN AUTO: 33.7 PG (ref 26.5–33)
MCHC RBC AUTO-ENTMCNC: 34 G/DL (ref 31.5–36.5)
MCV RBC AUTO: 99 FL (ref 78–100)
PLATELET # BLD AUTO: 203 10E3/UL (ref 150–450)
POTASSIUM BLD-SCNC: 4.5 MMOL/L (ref 3.5–5.1)
PROT SERPL-MCNC: 7.3 G/DL (ref 6.4–8.9)
PSA SERPL-MCNC: 0.79 UG/L (ref 0–4)
RBC # BLD AUTO: 4.83 10E6/UL (ref 4.4–5.9)
SODIUM SERPL-SCNC: 135 MMOL/L (ref 134–144)
WBC # BLD AUTO: 9.5 10E3/UL (ref 4–11)

## 2021-10-13 PROCEDURE — G0103 PSA SCREENING: HCPCS | Mod: ZL | Performed by: INTERNAL MEDICINE

## 2021-10-13 PROCEDURE — 36415 COLL VENOUS BLD VENIPUNCTURE: CPT | Mod: ZL | Performed by: INTERNAL MEDICINE

## 2021-10-13 PROCEDURE — 85027 COMPLETE CBC AUTOMATED: CPT | Mod: ZL | Performed by: INTERNAL MEDICINE

## 2021-10-13 PROCEDURE — 93971 EXTREMITY STUDY: CPT | Mod: RT

## 2021-10-13 PROCEDURE — 84155 ASSAY OF PROTEIN SERUM: CPT | Mod: ZL | Performed by: INTERNAL MEDICINE

## 2021-10-13 PROCEDURE — 82040 ASSAY OF SERUM ALBUMIN: CPT | Mod: ZL | Performed by: INTERNAL MEDICINE

## 2021-10-13 PROCEDURE — 99214 OFFICE O/P EST MOD 30 MIN: CPT | Performed by: INTERNAL MEDICINE

## 2021-10-13 RX ORDER — RIVAROXABAN 15 MG-20MG
KIT ORAL
Qty: 1 EACH | Refills: 0 | Status: SHIPPED | OUTPATIENT
Start: 2021-10-13 | End: 2022-01-13

## 2021-10-13 ASSESSMENT — PAIN SCALES - GENERAL: PAINLEVEL: SEVERE PAIN (6)

## 2021-10-13 ASSESSMENT — MIFFLIN-ST. JEOR: SCORE: 1589.95

## 2021-10-13 NOTE — NURSING NOTE
Patient presents to clinic today for right lower extremity pain, bruising and swelling for about a month.    Medication reconciliation completed.    ACP on file? No, form provided    FOOD SECURITY SCREENING QUESTIONS  Hunger Vital Signs:  Within the past 12 months we worried whether our food would run out before we got money to buy more. Never  Within the past 12 months the food we bought just didn't last and we didn't have money to get more. Never    Joy Mrieles CMA([facility])..................10/13/2021   2:38 PM

## 2021-10-13 NOTE — PATIENT INSTRUCTIONS
Patient Education     Avoid NSAIDS (ibuprofen, aspirin, naproxen, aleve, advil) due to risk for increased blood pressure,stomach pain/nausea/ulcers and kidney damage and risk for bleeding    Ok to take Tylenol 1000mg (2- extra strength 500mgtablets or 3 regular strength 325mg tablets) three times a day; Maximum of 4000mg daily    - Return/call as needed for follow-up should any new symptoms develop, for worsening of current symptoms or if symptoms do notresolve with above plan.    Deep Vein Thrombosis (DVT)  Deep vein thrombosis (DVT) occurs when a blood clot (thrombus) forms in a deep vein. This happens most often in the leg. It can also happen in the arms or other parts of the body. A part of the clot called an embolus can break off and travel to the lungs. When this happens, it s called a pulmonary embolism (PE). PE is a medical emergency. It can cut off blood flow and lead to death. Both DVT and PE are closely related. Together, they are often referred to by the term venous thromboembolism (VTE).     Risk factors for DVT  Anything that slows blood flow, injures the lining of a vein, or increases blood clotting can make you more prone to having DVT. This includes the following:     Long periods without movement (such as when sitting for many hours at a time or when recovering from major surgery or illness)    Estrogen (female hormone) therapy, such as hormone replacement therapy (HRT) or birth control pills    Fractured hip or leg    Major surgery or joint replacement    Major trauma or spinal cord injury    Cancer    Family history    Excess weight or obesity    Smoking    Older age  Symptoms  DVT does not always cause symptoms. When symptoms do occur, they may appear around the site of the DVT, such as in the leg. Possible symptoms include:     Swelling    Pain    Warmth    Redness    Tenderness  Home care    You were likely prescribed blood thinners (anticoagulants). They may be given as pills (oral) or shots  (injections). Follow all instructions when using these medicines. Note: Don't take blood thinners with other medicines, herbal remedies, or supplements without talking to your provider first. Certain medicines or products can affect how blood thinners work.    Follow your provider s instructions about activity and rest.    If support or compression stockings are prescribed, wear them as directed. These may help improve blood flow in the legs.    When sitting or lying down, move your ankles, toes and knees often. This may also help improve blood flow in the legs.    Follow-up care  Follow up with your healthcare provider, or as advised. If imaging tests were done, they may need further review by a doctor. You will be told of any new findings that may affect your care.   When to seek medical advice  Call your healthcare provider right away if any of these occur:    New or increased swelling, pain, tenderness, warmth, or redness, in the leg, arm, or other area    Blood in the urine    Bleeding with bowel movements  Call 911  Call 911 if any of these occur:     Bleeding from the nose, gums, a cut, or vagina    Heavy or uncontrolled bleeding    Trouble breathing    Chest pain or discomfort that worsens with deep breathing or coughing    Coughing (may cough up blood)    Fast heartbeat    Sweating    Anxiety    Lightheadedness, dizziness, or fainting  [email or web address] last reviewed this educational content on 4/1/2018 2000-2021 [employer]. All rights reserved. This information is not intended as a substitute for professional medical care. Always follow your healthcare professional's instructions.

## 2021-10-13 NOTE — PROGRESS NOTES
"Chief Complaint   Patient presents with     Leg Pain     lower right leg          Subjective:   Mr. Larsen is a 61 year old male  seen for the acute concern today of swelling in the right lower extremity along with some skin changes.    He reports that he recently drove to [place].  He drove approximately 730 miles.  Gel started about 3 and half weeks ago.  He was in [place] for about a week or more when his swelling started.  He just got back on Monday night, a few days ago.  He has noted some pain in the posterior calf.  He denies any obvious injury.  He has not had any recent surgeries.  He does not have a history of cancer.    He has not had any labs done recently.    Past medical history reviewed as below:     Past Medical History:   Diagnosis Date     Herpesviral vesicular dermatitis     9/27/2013   .      ROS:   Pertinent  ROS was performed and was negative, including for fevers, chills, chest pain, shortness of breath, changes in bowel or bladder, blood in the stool, difficulty swallowing, sores in the mouth. No other concerns, with exception of HPI above.      Objective:    /84 (BP Location: Right arm, Patient Position: Sitting, Cuff Size: Adult Regular)   Pulse 105   Temp 98.7  F (37.1  C) (Tympanic)   Resp 14   Ht 1.683 m (5' 6.25\")   Wt 84.8 kg (187 lb)   SpO2 95%   BMI 29.96 kg/m    GEN: Vitals reviewed.  Patient is in no acute distress. Cooperative with exam.  HEENT: Normocephalic atraumatic.  Eyes grossly normal to inspection.  No discharge or erythema, or obvious scleral/conjunctival abnormalities.  NECK: Supple; no thyromegaly.  No neck, cervical LAD.    CV: Heart regular in rate and rhythm with no murmur.   LUNGS: No audible wheeze, cough, or visible cyanosis.  No visible retractions or increased work of breathing.  Lungs clear to auscultation bilaterally.    SKIN: Warm and dry to touch.  Visible skin clear. No significant rash, abnormal pigmentation or lesions.  EXT: No clubbing or " cyanosis.   2-3+ lower extremity edema in the right leg.  No significant swelling of the left leg.  Mild bruising noted along ankle leg and slight erythema throughout.       Assessment/Plan:   Right leg swelling  See below  - US Lower Extremity Venous Duplex Right    Acute deep vein thrombosis (DVT) of popliteal vein of right lower extremity (H)  Right lower extremity duplex completed today with evidence of DVT.  The patient will be started on Xarelto.  PSA checked today to round out cancer screening.  He is to call with any side effects from the medication or signs of bleeding.  He is to follow-up in 3 months to determine whether any additional evaluation or treatment is indicated.  - rivaroxaban ANTICOAGULANT (XARELTO) 20 MG TABS tablet  Dispense: 30 tablet; Refill: 2  - Rivaroxaban ANTICOAGULANT (XARELTO STARTER PACK ANTICOAGULANT) 15 & 20 MG TBPK  Dispense: 1 each; Refill: 0    Screening for prostate cancer  - PSA Screen GH    High risk medication use  - CBC W PLT No Diff    Screening for diabetes mellitus  - Comprehensive Metabolic Panel      - Return/call as needed for follow-up should any new symptoms develop, for worsening of current symptoms or if symptoms do not resolve with above plan.    Return in about 3 months (around 1/13/2022) for Recheck.     ROB RICK DO   10/13/2021 3:15 PM

## 2021-10-13 NOTE — LETTER
October 14, 2021      Janell Larsen  [address] [place] [address]        Dear ,    We are writing to inform you of your test results.    Your test results fall within the expected range(s) or remain unchanged from previous results.  Please continue with current treatment plan.    Resulted Orders   PSA Screen GH   Result Value Ref Range    Prostate Specific Antigen Screen 0.79 0.00 - 4.00 ug/L    Narrative    The DXI Access PSAS WHO assay is a two site immunoenzymatic   assay. Assay values obtained with different assay methods cannot be used   interchangeably due to differences in assay methods and reagent specificity.   CBC W PLT No Diff   Result Value Ref Range    WBC Count 9.5 4.0 - 11.0 10e3/uL    RBC Count 4.83 4.40 - 5.90 10e6/uL    Hemoglobin 16.3 13.3 - 17.7 g/dL    Hematocrit 47.9 40.0 - 53.0 %    MCV 99 78 - 100 fL    MCH 33.7 (H) 26.5 - 33.0 pg    MCHC 34.0 31.5 - 36.5 g/dL    RDW 13.5 10.0 - 15.0 %    Platelet Count 203 150 - 450 10e3/uL   Comprehensive Metabolic Panel   Result Value Ref Range    Sodium 135 134 - 144 mmol/L    Potassium 4.5 3.5 - 5.1 mmol/L    Chloride 104 98 - 107 mmol/L    Carbon Dioxide (CO2) 23 21 - 31 mmol/L    Anion Gap 8 3 - 14 mmol/L    Urea Nitrogen 19 7 - 25 mg/dL    Creatinine 0.95 0.70 - 1.30 mg/dL    Calcium 9.6 8.6 - 10.3 mg/dL    Glucose 95 70 - 105 mg/dL    Alkaline Phosphatase 66 34 - 104 U/L    AST 30 13 - 39 U/L    ALT 28 7 - 52 U/L    Protein Total 7.3 6.4 - 8.9 g/dL    Albumin 4.1 3.5 - 5.7 g/dL    Bilirubin Total 0.7 0.3 - 1.0 mg/dL    GFR Estimate 85 >60 mL/min/1.73m2      Comment:      As of July 11, 2021, eGFR is calculated by the CKD-EPI creatinine equation, without race adjustment. eGFR can be influenced by muscle mass, exercise, and diet. The reported eGFR is an estimation only and is only applicable if the renal function is stable.       If you have any questions or concerns, please call the clinic at the number listed above.        Sincerely,      Lucila Bains, DO

## 2021-10-13 NOTE — TELEPHONE ENCOUNTER
Dr Brown called regarding an ultrasound that was done on the patient . It is positive for a DVT .

## 2021-10-13 NOTE — TELEPHONE ENCOUNTER
Dr Bains received the result.  This was discussed with patient in clinic.  Joy Mireles CMA([facility])..................10/13/2021   5:17 PM

## 2021-10-26 ENCOUNTER — OFFICE VISIT (OUTPATIENT)
Dept: PEDIATRICS | Facility: OTHER | Age: 61
End: 2021-10-26
Attending: INTERNAL MEDICINE
Payer: COMMERCIAL

## 2021-10-26 VITALS
RESPIRATION RATE: 16 BRPM | SYSTOLIC BLOOD PRESSURE: 128 MMHG | TEMPERATURE: 95.8 F | DIASTOLIC BLOOD PRESSURE: 98 MMHG | BODY MASS INDEX: 30.12 KG/M2 | WEIGHT: 188 LBS | OXYGEN SATURATION: 99 % | HEART RATE: 84 BPM

## 2021-10-26 DIAGNOSIS — I82.461 ACUTE DEEP VEIN THROMBOSIS (DVT) OF CALF MUSCLE VEIN OF RIGHT LOWER EXTREMITY (H): Primary | ICD-10-CM

## 2021-10-26 PROCEDURE — 99213 OFFICE O/P EST LOW 20 MIN: CPT | Performed by: INTERNAL MEDICINE

## 2021-10-26 ASSESSMENT — PAIN SCALES - GENERAL: PAINLEVEL: SEVERE PAIN (6)

## 2021-10-26 NOTE — PROGRESS NOTES
Subjective   Janell Larsen is a 61 year old male who presents for continued leg pain, redness, and swelling following diagnosis of DVT on 10/13. Patient initially began having symptoms five weeks ago after a road trip to [place]. Workup on the 10/13 including US was positive for DVT. He was started on Xarelto 15mg BID at that time. Is concerned that symptoms have not improved and may have worsened since then. Notes pain is the worst when walking on uneven surfaces or when forced to dorsiflex, noting the pain at the posterior heel. No drastic changes in the past few weeks. Denies fever, chills, headache, shortness of breath or worsening of pain. No known history of blood clots or stroke in himself or immediate family. Is COVID-19 vaccinated.     Objective   Vitals: BP (!) 128/98   Pulse 84   Temp (!) 95.8  F (35.4  C) (Tympanic)   Resp 16   Wt 85.3 kg (188 lb)   SpO2 99%   BMI 30.12 kg/m      General: well appearing; appropriately dressed; NAD   Neuro: Grossly intact  Musculoskeletal: R lower leg is erythematous and edematous. 2-3+ lower extremity edema in the right leg.  No significant swelling of the left leg.  Mild bruising noted along ankle and leg with mild diffuse erythema throughout.  Skin: No rash appreciated with exception of erythema from likely DVT  Psychiatry: Normal affect and insight.    US report reviewed 10/13/21: +DVT    Assessment & Plan   1. Acute deep vein thrombosis (DVT) of calf muscle vein of right lower extremity (H)  Symptoms consistent with known DVT from 10/13. Educated patient on how long symptoms may last and that pain and redness are likely result of clot. Patient concerned about activity restrictions but reassured him that as long as he continues taking Xarelto a recommended, he can continue with his activities as tolerated. Willing to be fitted for compression socks to help with pain and swelling. Differential diagnosis includes trauma vs infection vs plantaris rupture vs  popliteal cyst vs tumor/malignancy. No known trauma. Appearance of leg inconsistent with infectious etiology and patient has been afebrile without other signs of infection. US on 10/13 showed DVT without any evidence of other MSK etiology. PSA was WNL at 10/13 presentation and patient has routinely undergone other health screening measures.     - Continue Xarelto as prescribed, at least 6 months  - Compression stockings   - Heat/ice and other conservative measures as tolerated   - Miscellaneous DME Order      Kristian Lake, Medical Student - Year 3    Attestation Statement:  I was present with the medical student who participated in the service and in the documentation of this note. I have verified the history and personally performed the physical exam and medical decision making, and have verified the content of the note which accurately reflects my assessment of the patient and the plan of care.    Signed, Trey Farley MD, FAAP, FACP  Internal Medicine & Pediatrics  10/26/2021 11:55 AM          DME (Durable Medical Equipment) Orders and Documentation  Orders Placed This Encounter   Procedures     Miscellaneous DME Order      The patient was assessed and it was determined the patient is in need of the following listed DME Supplies/Equipment. Please complete supporting documentation below to demonstrate medical necessity.      DME All Other Item(s) Documentation    List reason for need and supporting documentation for medical necessity below for each DME item.   1. Acute deep vein thrombosis (DVT) of calf muscle vein of right lower extremity (H)  (primary encounter diagnosis)

## 2021-10-26 NOTE — NURSING NOTE
Chief Complaint   Patient presents with     RECHECK     Right lower leg worsening - Acute DVT 10/13/21     Medication Reconciliation: complete    FOOD SECURITY SCREENING QUESTIONS  Hunger Vital Signs:  Within the past 12 months we worried whether our food would run out before we got money to buy more. Never  Within the past 12 months the food we bought just didn't last and we didn't have money to get more. Never    Maty Joaquin CMA ([facility])

## 2021-11-26 DIAGNOSIS — I10 BENIGN ESSENTIAL HYPERTENSION: ICD-10-CM

## 2021-11-26 RX ORDER — LISINOPRIL 20 MG/1
TABLET ORAL
Qty: 90 TABLET | Refills: 0 | Status: SHIPPED | OUTPATIENT
Start: 2021-11-26 | End: 2022-01-13

## 2021-11-26 NOTE — TELEPHONE ENCOUNTER
"RHIANNON Gould NP,     Last Prescription Date:   9/21/2020  Last Fill Qty/Refills:         90, R-3  Last Office Visit:              10/26/2021 Dr. Farley (related to DVT)  Future Office visit:           none  Routing refill request to provider for review/approval because:  Blood pressure out of range     In clinical absence of patient's primary, Amrit Patino, Rx request sent to the covering provider for consideration.    Thank you,   Aissatou Chua, RN    Requested Prescriptions   Pending Prescriptions Disp Refills     lisinopril (ZESTRIL) 20 MG tablet [Pharmacy Med Name: LISINOPRIL 20 MG TABLET] 90 tablet 3     Sig: TAKE 1 TABLET BY MOUTH EVERY DAY       ACE Inhibitors (Including Combos) Protocol Failed - 11/26/2021  2:25 PM        Failed - Blood pressure under 140/90 in past 12 months     BP Readings from Last 3 Encounters:   10/26/21 (!) 128/98   10/13/21 112/84   07/31/20 (!) 136/98                 Passed - Recent (12 mo) or future (30 days) visit within the authorizing provider's specialty     Patient has had an office visit with the authorizing provider or a provider within the authorizing providers department within the previous 12 mos or has a future within next 30 days. See \"Patient Info\" tab in inbasket, or \"Choose Columns\" in Meds & Orders section of the refill encounter.              Passed - Medication is active on med list        Passed - Patient is age 18 or older        Passed - Normal serum creatinine on file in past 12 months     Recent Labs   Lab Test 10/13/21  1710   CR 0.95       Ok to refill medication if creatinine is low          Passed - Normal serum potassium on file in past 12 months     Recent Labs   Lab Test 10/13/21  1710   POTASSIUM 4.5                "

## 2022-01-13 ENCOUNTER — OFFICE VISIT (OUTPATIENT)
Dept: INTERNAL MEDICINE | Facility: OTHER | Age: 62
End: 2022-01-13
Attending: INTERNAL MEDICINE
Payer: COMMERCIAL

## 2022-01-13 VITALS
TEMPERATURE: 96.8 F | HEART RATE: 93 BPM | HEIGHT: 68 IN | BODY MASS INDEX: 27.98 KG/M2 | SYSTOLIC BLOOD PRESSURE: 138 MMHG | WEIGHT: 184.6 LBS | DIASTOLIC BLOOD PRESSURE: 88 MMHG | RESPIRATION RATE: 18 BRPM | OXYGEN SATURATION: 98 %

## 2022-01-13 DIAGNOSIS — Z86.718 HISTORY OF DEEP VENOUS THROMBOSIS: ICD-10-CM

## 2022-01-13 DIAGNOSIS — E78.2 MIXED HYPERLIPIDEMIA: ICD-10-CM

## 2022-01-13 DIAGNOSIS — I10 BENIGN ESSENTIAL HYPERTENSION: ICD-10-CM

## 2022-01-13 DIAGNOSIS — Z71.85 VACCINE COUNSELING: ICD-10-CM

## 2022-01-13 DIAGNOSIS — Z00.00 ANNUAL PHYSICAL EXAM: Primary | ICD-10-CM

## 2022-01-13 PROBLEM — R11.15 INTRACTABLE CYCLICAL VOMITING WITH NAUSEA: Status: RESOLVED | Noted: 2018-05-18 | Resolved: 2022-01-13

## 2022-01-13 PROBLEM — R74.01 ELEVATED AST (SGOT): Status: RESOLVED | Noted: 2020-07-31 | Resolved: 2022-01-13

## 2022-01-13 PROCEDURE — 99396 PREV VISIT EST AGE 40-64: CPT | Performed by: INTERNAL MEDICINE

## 2022-01-13 RX ORDER — LISINOPRIL 20 MG/1
20 TABLET ORAL DAILY
Qty: 90 TABLET | Refills: 3 | Status: SHIPPED | OUTPATIENT
Start: 2022-01-13 | End: 2022-03-01

## 2022-01-13 ASSESSMENT — ENCOUNTER SYMPTOMS
VOMITING: 0
DIARRHEA: 0
WHEEZING: 0
WOUND: 0
SHORTNESS OF BREATH: 0
NAUSEA: 0
ABDOMINAL PAIN: 0
BRUISES/BLEEDS EASILY: 0
CONFUSION: 0
AGITATION: 0
DYSURIA: 0
HEMATURIA: 0
CHILLS: 0
ARTHRALGIAS: 0
FEVER: 0
LIGHT-HEADEDNESS: 0
COUGH: 0
MYALGIAS: 0
DIZZINESS: 0

## 2022-01-13 ASSESSMENT — MIFFLIN-ST. JEOR: SCORE: 1606.84

## 2022-01-13 ASSESSMENT — PAIN SCALES - GENERAL: PAINLEVEL: NO PAIN (0)

## 2022-01-13 NOTE — PROGRESS NOTES
"Assessment & Plan       ICD-10-CM    1. Annual physical exam  Z00.00    2. History of deep venous thrombosis - Fall 2021  Z86.718 aspirin (ASA) 81 MG EC tablet   3. Benign essential hypertension  I10 lisinopril (ZESTRIL) 20 MG tablet   4. Mixed hyperlipidemia  E78.2    5. Vaccine counseling  Z71.85    Patient presents for annual physical examination, as well as follow-up DVT.    Patient went on a long car ride up to [place].  Approximately 12 hours of driving.  He had a provoked event, being seated for many hours in duration.  This happened in October 2021.  He has now been on oral anticoagulation with Xarelto for the last 3 months.  Given that this is his first DVT.  DVT was provoked.  He would like to discontinue anticoagulation.  Xarelto discontinued.  Recommend taking aspirin for a few days prior to any long trips where he is seated and driving again, and continuing until he comes home.  Otherwise follow-up as needed.    Hypertension, blood pressure initially was elevated, recheck did improve.  Advised to monitor at home.  If blood pressure remains elevated we will need to make medication changes.  For now continue with lisinopril 20 mg daily.  Follow-up as needed for elevated blood pressures.    Mixed hyperlipidemia, cluster levels were high previously but he is currently not interested in statin therapy.  Encouraged healthy diet exercise and weight loss.    Vaccine counseling completed.  Encouraged consideration of flu shot, shingles vaccination.         BMI:   Estimated body mass index is 28.07 kg/m  as calculated from the following:    Height as of this encounter: 1.727 m (5' 8\").    Weight as of this encounter: 83.7 kg (184 lb 9.6 oz).     Encouraged regular exercise    Return in about 1 year (around 1/13/2023) for Annual Physical Exam.    Amrit Patino MD  [facility] \[facility] [place] [facility]\""    Patricia Maciel is a 61 year old who presents for the following health issues     History of Present Illness " "      Hypertension: He presents for follow up of hypertension.  He does not check blood pressure  regularly outside of the clinic. Outside blood pressures have been over 140/90. He does not follow a low salt diet.     He eats 0-1 servings of fruits and vegetables daily.He consumes 2 sweetened beverage(s) daily.He exercises with enough effort to increase his heart rate 10 to 19 minutes per day.  He exercises with enough effort to increase his heart rate 3 or less days per week.   He is taking medications regularly.       Review of Systems   Constitutional: Negative for chills and fever.   HENT: Negative for congestion and hearing loss.    Eyes: Negative for visual disturbance.   Respiratory: Negative for cough, shortness of breath and wheezing.    Cardiovascular: Positive for peripheral edema (Right leg). Negative for chest pain.   Gastrointestinal: Negative for abdominal pain, diarrhea, nausea and vomiting.        Hx of nissen fundoplication problems - ended up needing surgery to fix the problem   Endocrine: Negative for cold intolerance and heat intolerance.   Genitourinary: Negative for dysuria and hematuria.   Musculoskeletal: Negative for arthralgias and myalgias.   Skin: Negative for rash and wound.   Allergic/Immunologic: Negative for immunocompromised state.   Neurological: Negative for dizziness and light-headedness.   Hematological: Does not bruise/bleed easily.   Psychiatric/Behavioral: Negative for agitation and confusion.            Objective    /88 (BP Location: Right arm, Patient Position: Sitting, Cuff Size: Adult Regular)   Pulse 93   Temp 96.8  F (36  C) (Tympanic)   Resp 18   Ht 1.727 m (5' 8\")   Wt 83.7 kg (184 lb 9.6 oz)   SpO2 98%   BMI 28.07 kg/m    Body mass index is 28.07 kg/m .  Physical Exam  Constitutional:       General: He is not in acute distress.     Appearance: He is well-developed. He is not diaphoretic.   HENT:      Head: Normocephalic and atraumatic.   Eyes:      " General: No scleral icterus.     Conjunctiva/sclera: Conjunctivae normal.   Neck:      Vascular: No carotid bruit.   Cardiovascular:      Rate and Rhythm: Normal rate and regular rhythm.      Pulses: Normal pulses.   Pulmonary:      Effort: Pulmonary effort is normal.      Breath sounds: Normal breath sounds.   Abdominal:      Palpations: Abdomen is soft.      Tenderness: There is no abdominal tenderness.   Musculoskeletal:         General: No deformity.      Cervical back: Neck supple.      Right lower leg: Edema (trace) present.      Left lower leg: No edema.   Lymphadenopathy:      Cervical: No cervical adenopathy.   Skin:     General: Skin is warm and dry.      Coloration: Skin is not jaundiced.      Findings: No rash.   Neurological:      Mental Status: He is alert and oriented to person, place, and time. Mental status is at baseline.   Psychiatric:         Mood and Affect: Mood normal.         Behavior: Behavior normal.          Recent Labs   Lab Test 10/13/21  1710 07/31/20  0948 04/24/18  1412   LDL  --  112*  --    HDL  --  59  --    TRIG  --  86  --    ALT 28 30 52   CR 0.95 1.02 0.87   GFRESTIMATED 85 74 90   GFRESTBLACK  --  90 >90   POTASSIUM 4.5 4.8 4.3   No recent lab work.

## 2022-01-13 NOTE — PATIENT INSTRUCTIONS
Immunization History   Administered Date(s) Administered     COVID-19,KATHY,Pfizer (12+ Yrs) 04/16/2021, 05/07/2021, 11/17/2021     TDAP Vaccine (Boostrix) 01/28/2013      -- Consider Annual Flu / Influenza vaccination - Every Fall (Starting about October 1st)      -- Get your tetanus shot updated - from one of the local pharmacies, at your convenience or the Local Public Health Department. --- AFTER 01/28/2023.     -- Get the new shingles shot (2 in series) (Shingrix) - from one of the local pharmacies, at your convenience. -- Check cost/coverage.   -- or -- If these are very expensive, go to the Local [facility]     [facility] [place] [address]    ---- Shot clinic is the FIRST Thursday of Each Month -- from 2 to 6 PM, by Appointment only.     Call for an appointment -- [phone]        1. Annual physical exam    2. History of deep venous thrombosis - Fall 2021    START:   - aspirin (ASA) 81 MG EC tablet; Take 1 tablet (81 mg) by mouth daily --> Take Few days before and during long trips, until return home.    3. Benign essential hypertension    Continue:   - lisinopril (ZESTRIL) 20 MG tablet; Take 1 tablet (20 mg) by mouth daily  Dispense: 90 tablet; Refill: 3    4. Mixed hyperlipidemia  Continue with healthy diet, regular exercise.     5. Vaccine counseling  -- see above.       Return in approximately 1 year, or sooner as needed for follow-up with Dr. Patino.  - Annual Follow-up / Physical    Clinic : [phone]  Appointment line: [phone]

## 2022-01-13 NOTE — NURSING NOTE
Pt presents to clinic today for DVT of the right leg       FOOD SECURITY SCREENING QUESTIONS:    The next two questions are to help us understand your food security.  If you are feeling you need any assistance in this area, we have resources available to support you today.    Hunger Vital Signs:  Within the past 12 months we worried whether our food would run out before we got money to buy more. Never  Within the past 12 months the food we bought just didn't last and we didn't have money to get more. Never    Medication Reconciliation: complete  Jalil Palacio LPN,LPN on 1/13/2022 at 10:44 AM

## 2022-06-14 ENCOUNTER — TELEPHONE (OUTPATIENT)
Dept: INTERNAL MEDICINE | Facility: OTHER | Age: 62
End: 2022-06-14
Payer: COMMERCIAL

## 2022-06-14 NOTE — TELEPHONE ENCOUNTER
Patient is on 4th day of symptoms and would like to get the medicine-his pcp is gone- please call --Tested positive today home test and was exposed by [phone] or cell [phone]

## 2022-06-15 ENCOUNTER — VIRTUAL VISIT (OUTPATIENT)
Dept: INTERNAL MEDICINE | Facility: OTHER | Age: 62
End: 2022-06-15
Attending: INTERNAL MEDICINE
Payer: COMMERCIAL

## 2022-06-15 DIAGNOSIS — R19.7 DIARRHEA, UNSPECIFIED TYPE: ICD-10-CM

## 2022-06-15 DIAGNOSIS — U07.1 INFECTION DUE TO 2019 NOVEL CORONAVIRUS: Primary | ICD-10-CM

## 2022-06-15 PROBLEM — R11.2 NAUSEA & VOMITING: Status: ACTIVE | Noted: 2018-05-18

## 2022-06-15 PROBLEM — K21.9 GASTROESOPHAGEAL REFLUX DISEASE: Status: ACTIVE | Noted: 2019-04-08

## 2022-06-15 PROBLEM — J98.6 DISORDER OF DIAPHRAGM: Status: ACTIVE | Noted: 2019-04-08

## 2022-06-15 PROBLEM — K44.9 HIATAL HERNIA: Status: ACTIVE | Noted: 2019-04-08

## 2022-06-15 PROCEDURE — 99212 OFFICE O/P EST SF 10 MIN: CPT | Mod: TEL | Performed by: INTERNAL MEDICINE

## 2022-06-15 RX ORDER — LOPERAMIDE HYDROCHLORIDE 2 MG/1
2 TABLET ORAL 4 TIMES DAILY PRN
Qty: 120 TABLET | Refills: 4 | Status: SHIPPED | OUTPATIENT
Start: 2022-06-15 | End: 2023-04-27

## 2022-06-15 RX ORDER — GUAIFENESIN AND DEXTROMETHORPHAN HYDROBROMIDE 600; 30 MG/1; MG/1
1 TABLET, EXTENDED RELEASE ORAL EVERY 12 HOURS
Qty: 60 TABLET | Refills: 1 | Status: SHIPPED | OUTPATIENT
Start: 2022-06-15 | End: 2023-04-27

## 2022-06-15 ASSESSMENT — ENCOUNTER SYMPTOMS
SHORTNESS OF BREATH: 1
CHILLS: 1
DIARRHEA: 1
FATIGUE: 1
COUGH: 1

## 2022-06-15 NOTE — TELEPHONE ENCOUNTER
Luis for 1:20 PM today - virtual appointment with me.     Electronically signed by:  Amrit Patino MD  on Lolita 15, 2022 9:01 AM

## 2022-06-15 NOTE — NURSING NOTE
"Chief Complaint   Patient presents with     COVID positive         Initial There were no vitals taken for this visit. Estimated body mass index is 28.07 kg/m  as calculated from the following:    Height as of 1/13/22: 1.727 m (5' 8\").    Weight as of 1/13/22: 83.7 kg (184 lb 9.6 oz).       FOOD SECURITY SCREENING QUESTIONS:    The next two questions are to help us understand your food security.  If you are feeling you need any assistance in this area, we have resources available to support you today.    Hunger Vital Signs:  Within the past 12 months we worried whether our food would run out before we got money to buy more. Never  Within the past 12 months the food we bought just didn't last and we didn't have money to get more. Never    Advance Care Directive on file? no      Medication reconciliation complete.      Vikram Brown,on 6/15/2022 at 9:49 AM        "

## 2022-06-15 NOTE — PROGRESS NOTES
"Janell is a 62 year old who is being evaluated via a billable telephone visit.   What phone number would you like to be contacted at? [phone]  How would you like to obtain your AVS? Mail a copy    Assessment & Plan       ICD-10-CM    1. Infection due to 2019 novel coronavirus  U07.1 nirmatrelvir and ritonavir (PAXLOVID) therapy pack     dextromethorphan-guaiFENesin (MUCINEX DM)  MG 12 hr tablet   2. Diarrhea, unspecified type  R19.7 loperamide (IMODIUM A-D) 2 MG tablet     Patient started with mild symptoms on Saturday of 6/11.  Got much worse on 6/12 with body aches and diarrhea.  Has been having ongoing issues since.  Has mild shortness of breath beyond normal.  More diarrhea issues than normal.  Some shortness of breath as noted.  Achy with some chills.  Patient qualifies for oral antiviral therapy with Paxlovid.  No drug interactions.  Continue lisinopril.    Start Imodium and Mucinex DM to help with diarrhea and cough.     COVID-19 positive patient.  Encounter for consideration of medication intervention. Patient does qualify for a prescription. Full discussion with patient including medication options, risks and benefits. Potential drug interactions reviewed with patient.     Treatment Planned Paxlovid, Rx sent to [facility] pharmacy    Temporary change to home medications:  None     Estimated body mass index is 28.07 kg/m  as calculated from the following:    Height as of 1/13/22: 1.727 m (5' 8\").    Weight as of 1/13/22: 83.7 kg (184 lb 9.6 oz).  GFR Estimate   Date Value Ref Range Status   10/13/2021 85 >60 mL/min/1.73m2 Final     Comment:     As of July 11, 2021, eGFR is calculated by the CKD-EPI creatinine equation, without race adjustment. eGFR can be influenced by muscle mass, exercise, and diet. The reported eGFR is an estimation only and is only applicable if the renal function is stable.   07/31/2020 74 >60 mL/min/[1.73_m2] Final     Lab Results   Component Value Date    [id number] Not Detected " 10/25/2020       Return for Return as needed for follow-up with Dr. Patino., Appointments: [phone].    Amrit Patino MD  [facility]    Subjective   Janell is a 62 year old who presents for the following health issues COVID postive yesterday    HPI       COVID-19 Symptom Review  How many days ago did these symptoms start? 4    Are any of the following symptoms significant for you?  New or worsening difficulty breathing? Yes    Please describe what kind of difficulty you are having breathing:Mild dyspnea (able to do ADLs without difficulty, mild shortness of breath with activities such as climbing one or two flights of stairs or walking briskly)    Worsening cough? Yes, I am coughing up mucus.    Fever or chills? I do not know    Headache: no    Sore throat: YES  Better now    Chest pain: no    Diarrhea: YES    Body aches? no    What treatments has patient tried? Acetaminophen   Does patient live in a nursing home, group home, or shelter? no  Does patient have a way to get food/medications during quarantined? Yes, I have a friend or family member who can help me.    Review of Systems   Constitutional: Positive for chills and fatigue.   Respiratory: Positive for cough and shortness of breath.    Gastrointestinal: Positive for diarrhea.          Objective           Vitals:  No vitals were obtained today due to virtual visit.    Physical Exam   alert, no distress and frequent coughing  PSYCH: Alert and oriented times 3; coherent speech, normal   rate and volume, able to articulate logical thoughts, able   to abstract reason, no tangential thoughts, no hallucinations   or delusions  His affect is normal  RESP: No audible wheezing, able to talk in moderately full sentences + Cough  Remainder of exam unable to be completed due to telephone visits          Phone call duration: 11 minutes

## 2022-06-15 NOTE — PATIENT INSTRUCTIONS

## 2022-06-15 NOTE — TELEPHONE ENCOUNTER
Schedule appointment with any available provider.     Electronically signed by:  Amrit Patino MD  on June 14, 2022 9:51 PM

## 2022-06-15 NOTE — TELEPHONE ENCOUNTER
No openings left in clinic today or tomorrow, or rest of the week patient will need a work in for antiviral medication today     Thank you,  Joy Benavidez on 6/15/2022 at 8:50 AM

## 2022-11-03 ENCOUNTER — DOCUMENTATION ONLY (OUTPATIENT)
Dept: OTHER | Facility: CLINIC | Age: 62
End: 2022-11-03

## 2023-04-14 DIAGNOSIS — I10 BENIGN ESSENTIAL HYPERTENSION: ICD-10-CM

## 2023-04-17 NOTE — TELEPHONE ENCOUNTER
Patient was contacted and an appointment was made with Dr. Patino for 4.25.2023.  He has enough medication to get him to that date.  Sanam Orozco on 4/17/2023 at 10:10 AM

## 2023-04-17 NOTE — TELEPHONE ENCOUNTER
[facility] sent Rx request for the following:    LISINOPRIL 20 MG TABLET  Last Prescription Date:   3/1/22  Last Fill Qty/Refills:         90, R-3    Last Office Visit:              1/13/22   Future Office visit:           None  Due for appointment, please call to schedule.  Zulay Waldrop RN on 4/17/2023 at 9:22 AM

## 2023-04-18 RX ORDER — LISINOPRIL 20 MG/1
TABLET ORAL
Qty: 90 TABLET | Refills: 0 | OUTPATIENT
Start: 2023-04-18

## 2023-04-27 ENCOUNTER — OFFICE VISIT (OUTPATIENT)
Dept: INTERNAL MEDICINE | Facility: OTHER | Age: 63
End: 2023-04-27
Attending: INTERNAL MEDICINE
Payer: COMMERCIAL

## 2023-04-27 VITALS
HEIGHT: 67 IN | RESPIRATION RATE: 20 BRPM | BODY MASS INDEX: 28.25 KG/M2 | DIASTOLIC BLOOD PRESSURE: 68 MMHG | HEART RATE: 102 BPM | WEIGHT: 180 LBS | OXYGEN SATURATION: 94 % | SYSTOLIC BLOOD PRESSURE: 128 MMHG | TEMPERATURE: 97.3 F

## 2023-04-27 DIAGNOSIS — Z12.5 ENCOUNTER FOR SCREENING FOR MALIGNANT NEOPLASM OF PROSTATE: ICD-10-CM

## 2023-04-27 DIAGNOSIS — D03.62 MELANOMA IN SITU OF LEFT UPPER EXTREMITY (H): ICD-10-CM

## 2023-04-27 DIAGNOSIS — Z86.718 HISTORY OF DEEP VENOUS THROMBOSIS: ICD-10-CM

## 2023-04-27 DIAGNOSIS — R74.8 ELEVATED LIVER ENZYMES: ICD-10-CM

## 2023-04-27 DIAGNOSIS — Z00.00 WELCOME TO MEDICARE PREVENTIVE VISIT: ICD-10-CM

## 2023-04-27 DIAGNOSIS — I10 BENIGN ESSENTIAL HYPERTENSION: ICD-10-CM

## 2023-04-27 DIAGNOSIS — Z78.9 REGULAR ALCOHOL CONSUMPTION: ICD-10-CM

## 2023-04-27 DIAGNOSIS — R19.7 FREQUENT DIARRHEA: Primary | ICD-10-CM

## 2023-04-27 DIAGNOSIS — E78.2 MIXED HYPERLIPIDEMIA: ICD-10-CM

## 2023-04-27 DIAGNOSIS — Z98.890 S/P NISSEN FUNDOPLICATION (WITHOUT GASTROSTOMY TUBE) PROCEDURE: ICD-10-CM

## 2023-04-27 PROBLEM — K44.9 HIATAL HERNIA: Status: RESOLVED | Noted: 2019-04-08 | Resolved: 2023-04-27

## 2023-04-27 PROBLEM — R11.2 NAUSEA & VOMITING: Status: RESOLVED | Noted: 2018-05-18 | Resolved: 2023-04-27

## 2023-04-27 LAB
ALBUMIN SERPL BCG-MCNC: 4 G/DL (ref 3.5–5.2)
ALP SERPL-CCNC: 77 U/L (ref 40–129)
ALT SERPL W P-5'-P-CCNC: 74 U/L (ref 10–50)
ANION GAP SERPL CALCULATED.3IONS-SCNC: 10 MMOL/L (ref 7–15)
AST SERPL W P-5'-P-CCNC: 93 U/L (ref 10–50)
BASOPHILS # BLD AUTO: 0 10E3/UL (ref 0–0.2)
BASOPHILS NFR BLD AUTO: 0 %
BILIRUB SERPL-MCNC: 0.8 MG/DL
BUN SERPL-MCNC: 7.8 MG/DL (ref 8–23)
CALCIUM SERPL-MCNC: 9.7 MG/DL (ref 8.8–10.2)
CHLORIDE SERPL-SCNC: 101 MMOL/L (ref 98–107)
CHOLEST SERPL-MCNC: 200 MG/DL
CREAT SERPL-MCNC: 0.94 MG/DL (ref 0.67–1.17)
DEPRECATED HCO3 PLAS-SCNC: 25 MMOL/L (ref 22–29)
EOSINOPHIL # BLD AUTO: 0 10E3/UL (ref 0–0.7)
EOSINOPHIL NFR BLD AUTO: 1 %
ERYTHROCYTE [DISTWIDTH] IN BLOOD BY AUTOMATED COUNT: 12.9 % (ref 10–15)
GFR SERPL CREATININE-BSD FRML MDRD: >90 ML/MIN/1.73M2
GLUCOSE SERPL-MCNC: 102 MG/DL (ref 70–99)
HCT VFR BLD AUTO: 48.5 % (ref 40–53)
HDLC SERPL-MCNC: 109 MG/DL
HGB BLD-MCNC: 17 G/DL (ref 13.3–17.7)
IMM GRANULOCYTES # BLD: 0 10E3/UL
IMM GRANULOCYTES NFR BLD: 0 %
LDLC SERPL CALC-MCNC: 80 MG/DL
LYMPHOCYTES # BLD AUTO: 1.3 10E3/UL (ref 0.8–5.3)
LYMPHOCYTES NFR BLD AUTO: 19 %
MCH RBC QN AUTO: 34.4 PG (ref 26.5–33)
MCHC RBC AUTO-ENTMCNC: 35.1 G/DL (ref 31.5–36.5)
MCV RBC AUTO: 98 FL (ref 78–100)
MONOCYTES # BLD AUTO: 0.8 10E3/UL (ref 0–1.3)
MONOCYTES NFR BLD AUTO: 11 %
NEUTROPHILS # BLD AUTO: 4.9 10E3/UL (ref 1.6–8.3)
NEUTROPHILS NFR BLD AUTO: 69 %
NONHDLC SERPL-MCNC: 91 MG/DL
NRBC # BLD AUTO: 0 10E3/UL
NRBC BLD AUTO-RTO: 0 /100
PLATELET # BLD AUTO: 159 10E3/UL (ref 150–450)
POTASSIUM SERPL-SCNC: 4.9 MMOL/L (ref 3.4–5.3)
PROT SERPL-MCNC: 7.4 G/DL (ref 6.4–8.3)
PSA SERPL DL<=0.01 NG/ML-MCNC: 1.92 NG/ML (ref 0–4.5)
RBC # BLD AUTO: 4.94 10E6/UL (ref 4.4–5.9)
SODIUM SERPL-SCNC: 136 MMOL/L (ref 136–145)
TRIGL SERPL-MCNC: 53 MG/DL
WBC # BLD AUTO: 7.1 10E3/UL (ref 4–11)

## 2023-04-27 PROCEDURE — 80053 COMPREHEN METABOLIC PANEL: CPT | Mod: ZL | Performed by: INTERNAL MEDICINE

## 2023-04-27 PROCEDURE — 80061 LIPID PANEL: CPT | Mod: ZL | Performed by: INTERNAL MEDICINE

## 2023-04-27 PROCEDURE — 36415 COLL VENOUS BLD VENIPUNCTURE: CPT | Mod: ZL | Performed by: INTERNAL MEDICINE

## 2023-04-27 PROCEDURE — 99214 OFFICE O/P EST MOD 30 MIN: CPT | Mod: 25 | Performed by: INTERNAL MEDICINE

## 2023-04-27 PROCEDURE — G0463 HOSPITAL OUTPT CLINIC VISIT: HCPCS | Mod: 25

## 2023-04-27 PROCEDURE — G0402 INITIAL PREVENTIVE EXAM: HCPCS | Mod: 25,27 | Performed by: INTERNAL MEDICINE

## 2023-04-27 PROCEDURE — 85014 HEMATOCRIT: CPT | Mod: ZL | Performed by: INTERNAL MEDICINE

## 2023-04-27 PROCEDURE — G0103 PSA SCREENING: HCPCS | Mod: ZL | Performed by: INTERNAL MEDICINE

## 2023-04-27 PROCEDURE — G0402 INITIAL PREVENTIVE EXAM: HCPCS | Performed by: INTERNAL MEDICINE

## 2023-04-27 RX ORDER — LOPERAMIDE HYDROCHLORIDE 2 MG/1
2 TABLET ORAL 4 TIMES DAILY PRN
Qty: 120 TABLET | Refills: 11 | Status: SHIPPED | OUTPATIENT
Start: 2023-04-27

## 2023-04-27 RX ORDER — LISINOPRIL 20 MG/1
20 TABLET ORAL DAILY
Qty: 90 TABLET | Refills: 4 | Status: SHIPPED | OUTPATIENT
Start: 2023-04-27 | End: 2024-05-30

## 2023-04-27 ASSESSMENT — ENCOUNTER SYMPTOMS
DIARRHEA: 1
WEAKNESS: 0
HEADACHES: 0
NERVOUS/ANXIOUS: 1
ARTHRALGIAS: 0
JOINT SWELLING: 0
HEMATURIA: 0
DYSURIA: 0
COUGH: 1
FEVER: 0
NAUSEA: 0
DIZZINESS: 1
HEMATOCHEZIA: 0
BRUISES/BLEEDS EASILY: 0
MYALGIAS: 1
HEARTBURN: 0
CONSTIPATION: 0
SHORTNESS OF BREATH: 0
EYE PAIN: 0
SORE THROAT: 0
PARESTHESIAS: 0
CHILLS: 1
ABDOMINAL PAIN: 0
PALPITATIONS: 0
FREQUENCY: 0

## 2023-04-27 ASSESSMENT — PAIN SCALES - GENERAL: PAINLEVEL: NO PAIN (0)

## 2023-04-27 NOTE — PATIENT INSTRUCTIONS
Okay to start low-dose Aspirin 81 mg -- couple times weekly.     Labs today.     Blood pressure is well controlled.     Medications refilled.       Immunization History   Administered Date(s) Administered     COVID-19 Vaccine 12+ (Pfizer) 04/16/2021, 05/07/2021, 11/17/2021     TDAP Vaccine (Boostrix) 01/28/2013     Zoster recombinant adjuvanted (SHINGRIX) 01/13/2022, 05/24/2022      Consider:  -- Yearly BIVALENT - COVID-19 Vaccination shot (Fall 2022 = Omicron 4 and 5/COVID)  -- Annual Flu / Influenza vaccination - Every Fall (Starting about October 1st)    -- Pneumonia / Pneumococcal PCV vaccines -- after age 65.       -- Get your tetanus shot updated - from one of the local pharmacies, at your convenience or the Local Public Health Department.         Return in approximately 1 year, or sooner as needed for follow-up with Dr. Patino.  - Annual Follow-up / Physical - Initial - Medicare Annual Wellness Visit     Clinic : [phone]  Appointment line: [phone]   Patient Education   Personalized Prevention Plan  You are due for the preventive services outlined below.  Your care team is available to assist you in scheduling these services.  If you have already completed any of these items, please share that information with your care team to update in your medical record.  Health Maintenance Due   Topic Date Due     COVID-19 Vaccine (4 - Booster for Pfizer series) 01/12/2022     Flu Vaccine (1) Never done     Diptheria Tetanus Pertussis (DTAP/TDAP/TD) Vaccine (2 - Td or Tdap) 01/28/2023     Preventive Health Recommendations  See your health care provider every year to    Review health changes.     Discuss preventive care.      Review your medicines if your doctor has prescribed any.    Talk with your health care provider about whether you should have a test to screen for prostate cancer (PSA).    Every 3 years, have a diabetes test (fasting glucose). If you are at risk for diabetes, you should have this  test more often.    Every 5 years, have a cholesterol test. Have this test more often if you are at risk for high cholesterol or heart disease.     Every 10 years, have a colonoscopy. Or, have a yearly FIT test (stool test). These exams will check for colon cancer.    Talk to with your health care provider about screening for Abdominal Aortic Aneurysm if you have a family history of AAA or have a history of smoking.    Shots:     Get a flu shot each year.     Get a tetanus shot every 10 years.     Talk to your doctor about your pneumonia vaccines. There are now two you should receive - Pneumovax (PPSV 23) and Prevnar (PCV 13).    Talk to your pharmacist about a shingles vaccine.     Talk to your doctor about the hepatitis B vaccine.    Nutrition:     Eat at least 5 servings of fruits and vegetables each day.     Eat whole-grain bread, whole-wheat pasta and brown rice instead of white grains and rice.     Get adequate Calcium and Vitamin D.     Lifestyle    Exercise for at least 150 minutes a week (30 minutes a day, 5 days a week). This will help you control your weight and prevent disease.     Limit alcohol to one drink per day.     No smoking.     Wear sunscreen to prevent skin cancer.     See your dentist every six months for an exam and cleaning.     See your eye doctor every 1 to 2 years to screen for conditions such as glaucoma, macular degeneration and cataracts.    Personalized Prevention Plan  You are due for the preventive services outlined below.  Your care team is available to assist you in scheduling these services.  If you have already completed any of these items, please share that information with your care team to update in your medical record.  Health Maintenance   Topic Date Due     COVID-19 Vaccine (4 - Booster for Pfizer series) 01/12/2022     INFLUENZA VACCINE (1) Never done     DTAP/TDAP/TD IMMUNIZATION (2 - Td or Tdap) 01/28/2023     YEARLY PREVENTIVE VISIT  04/27/2024     LIPID  07/31/2025      COLORECTAL CANCER SCREENING  08/13/2025     ADVANCE CARE PLANNING  04/27/2028     HEPATITIS C SCREENING  Completed     PHQ-2 (once per calendar year)  Completed     ZOSTER IMMUNIZATION  Completed     HIV SCREENING  Addressed     Pneumococcal Vaccine: Pediatrics (0 to 5 Years) and At-Risk Patients (6 to 64 Years)  Aged Out     IPV IMMUNIZATION  Aged Out     MENINGITIS IMMUNIZATION  Aged Out       Exercise for a Healthier Heart  You may wonder how you can improve the health of your heart. If you re thinking about exercise, you re on the right track. You don t need to become an athlete. But you do need a certain amount of brisk exercise to help strengthen your heart. If you have been diagnosed with a heart condition, your healthcare provider may advise exercise to help your condition. To help make exercise a habit, choose safe, fun activities.      Exercise with a friend. When activity is fun, you're more likely to stick with it.     Before you start  Check with your healthcare provider before starting an exercise program. This is especially important if you haven't been active for a while. It's also important if you have a long-term (chronic) health problem such as heart disease, diabetes, or obesity. Also check with your provider if you're at high risk for having these problems.   Why exercise?  Exercising regularly offers many healthy rewards. It can help you do all of these:     Improve your blood cholesterol level to help prevent further heart trouble.    Lower your blood pressure to help prevent a stroke or heart attack.    Control diabetes or reduce your risk of getting this disease.    Improve your heart and lung function.    Reach and stay at a healthy weight.    Make your muscles stronger so you can stay active.    Prevent falls and fractures by slowing the loss of bone mass (osteoporosis).    Manage stress better.    Improve your sense of self and your body image.  Exercise tips      Ease into your  routine. Set small goals. Then build on them. Talk with your healthcare provider first before starting an exercise routine if you're not sure what your activity level should be.    Exercise on most days. Aim for a total of at least 150 minutes (2 hours and 30 minutes) or more of moderate-intensity aerobic activity each week. You could also do 75 minutes (1 hour and 15 minutes) or more of vigorous-intensity aerobic activity each week. Or try for a combination of both. Moderate activity means that you breathe heavier and your heart rate increases, but you can still talk. Think about doing at least 30 minutes of moderate exercise, 5 times a week. It's OK to work up to the 30-minute period over time. Examples of moderate-intensity activity are brisk walking, gardening, and water aerobics.    Step up your daily activity level.  Along with your exercise program, try being more active the whole day. Walk instead of drive. Or park further away so that you take more steps each day. Do more household tasks or yard work. You may not be able to meet the advised amount of physical activity. But doing some moderate- or vigorous-intensity aerobic activity can help reduce your risk for heart disease. Your healthcare provider can help you figure out what is best for you.    Choose 1 or more activities you enjoy.  Walking is one of the easiest things you can do. You can also try swimming, riding a bike, dancing, or taking an exercise class.    Call 911  Call 911 right away if any of these occur:     Chest pain that doesn't go away quickly with rest    New burning, tightness, pressure, or heaviness in your chest, neck, shoulders, back, or arms    Abnormal or severe shortness of breath    A very fast or irregular heartbeat (palpitations)    Fainting  When to call your healthcare provider  Call your healthcare provider if you have any of these:     Dizziness or lightheadedness    Mild shortness of breath or chest pain    Increased or  new joint or muscle pain    Robin last reviewed this educational content on 7/1/2022 2000-2022 [employer]. All rights reserved. This information is not intended as a substitute for professional medical care. Always follow your healthcare professional's instructions.

## 2023-04-27 NOTE — NURSING NOTE
"Chief Complaint   Patient presents with     Medicare Visit         Initial /68 (BP Location: Right arm, Patient Position: Sitting, Cuff Size: Adult Regular)   Pulse 102   Temp 97.3  F (36.3  C) (Temporal)   Resp 20   Ht 1.708 m (5' 7.25\")   Wt 81.6 kg (180 lb)   SpO2 94%   BMI 27.98 kg/m   Estimated body mass index is 27.98 kg/m  as calculated from the following:    Height as of this encounter: 1.708 m (5' 7.25\").    Weight as of this encounter: 81.6 kg (180 lb).       FOOD SECURITY SCREENING QUESTIONS:    The next two questions are to help us understand your food security.  If you are feeling you need any assistance in this area, we have resources available to support you today.    Hunger Vital Signs:  Within the past 12 months we worried whether our food would run out before we got money to buy more. Never  Within the past 12 months the food we bought just didn't last and we didn't have money to get more. Never    Advance Care Directive on file? no      Medication reconciliation complete.      Vikram Nur,on 4/27/2023 at 1:55 PM        "

## 2023-04-27 NOTE — PROGRESS NOTES
Assessment & Plan     ICD-10-CM    1. Frequent diarrhea  R19.7 loperamide (IMODIUM A-D) 2 MG tablet      2. S/P Nissen fundoplication (without gastrostomy tube) procedure  Z98.890       3. Benign essential hypertension  I10 lisinopril (ZESTRIL) 20 MG tablet     CBC and Differential     Comprehensive Metabolic Panel     CBC and Differential     Comprehensive Metabolic Panel      4. History of deep venous thrombosis - Fall 2021  Z86.718       5. 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 Mixed hyperlipidemia  E78.2 Lipid Panel     Lipid Panel      7. Encounter for screening for malignant neoplasm of prostate  Z12.5 PSA Screen GH     PSA Screen GH      8. Welcome to Medicare preventive visit  Z00.00       9. Regular alcohol consumption  Z78.9       10. Elevated liver enzymes  R74.8       Patient presents for welcome to Medicare visit as well as follow-up multiple issues.    Frequent diarrhea, history of Nissen fundoplication.  Recommend starting trial of daily Imodium.  Adjust dose as needed.  If this is not adequate, would consider trying Questran powder daily.  Reports heartburn is doing reasonably well since Nissen surgery.    History of DVT, back in follow-up 2021.  Has not had any recurrent issues.  Does take aspirin periodically.    History of melanoma of the left wrist.  This was back in 2018.  Following routinely with dermatology.    Prostate lab cancer screening, check PSA.    Hypertension. Ongoing. Blood pressure is currently well controlled.  Medication side effects: None. Denies syncope or presyncope.  No changes for now. Continue - Lisinopril.   Medication list reviewed/updated. Refills completed as needed.      Regular alcohol consumption, likely in part causing elevated liver enzymes.    - recommend reduced alcohol consumption.    Elevated liver enzymes, This will need to be rechecked and monitored.    Mixed hyperlipidemia.  Ongoing. LDL is at goal: Yes.  Triglycerides are at goal: Yes.    Medication side effects reported: None.  Diet controlled.   Recent Labs   Lab Test 04/27/23  1439 07/31/20  0948   CHOL 200* 188    59   LDL 80 112*   TRIG 53 86     Vaccine counseling completed.  Encouraged annual vaccinations.    Encouraged regular exercise.     No follow-ups on file.    Amrit Patino MD  [facility]     SUBJECTIVE:   Janell is a 62 year old who presents for Preventive Visit.      4/27/2023     1:50 PM   Additional Questions   Roomed by Vikram Murray LPN   Accompanied by n/a     Patient has been advised of split billing requirements and indicates understanding: Yes  Are you in the first 12 months of your Medicare coverage?  YES    Healthy Habits:     Getting at least 3 servings of Calcium per day:  NO    Bi-annual eye exam:  NO    Dental care twice a year:  Yes    Sleep apnea or symptoms of sleep apnea:  None    Diet:  Regular (no restrictions)    Frequency of exercise:  1 day/week    Duration of exercise:  N/A    Taking medications regularly:  Yes    Barriers to taking medications:  None    Medication side effects:  None    PHQ-2 Total Score: 0    Additional concerns today:  Yes      Have you ever done Advance Care Planning? (For example, a Health Directive, POLST, or a discussion with a medical provider or your loved ones about your wishes): No, advance care planning information given to patient to review.  Patient plans to discuss their wishes with loved ones or provider.       Fall risk     Cognitive Screening   1) Repeat 3 items (Leader, Season, Table)    2) Clock draw: NORMAL  3) 3 item recall: Recalls 3 objects  Results: 3 items recalled: COGNITIVE IMPAIRMENT LESS LIKELY    Mini-CogTM Copyright S Jean Carlos. Licensed by the author for use in [facility]; reprinted with permission (kalpesh@.[email or web address]). All rights reserved.      Do you have sleep apnea, excessive snoring or daytime drowsiness?: no    Reviewed and updated as needed  this visit by clinical staff   Tobacco  Allergies  Meds  Problems  Med Hx  Surg Hx  Fam Hx  Soc   Hx        Reviewed and updated as needed this visit by Provider   Tobacco  Allergies  Meds  Problems  Med Hx  Surg Hx  Fam Hx         Social History     Tobacco Use     Smoking status: Never     Smokeless tobacco: Never   Vaping Use     Vaping status: Never Used   Substance Use Topics     Alcohol use: Yes     Comment: chris:  3 drinks nightly         4/27/2023     1:50 PM   Alcohol Use   Prescreen: >3 drinks/day or >7 drinks/week? Yes     Do you have a current opioid prescription? No  Do you use any other controlled substances or medications that are not prescribed by a provider? None    Current providers sharing in care for this patient include:   Patient Care Team:  Amrit Patino MD as PCP - General (Internal Medicine)  Amrit Patino MD as Assigned PCP    The following health maintenance items are reviewed in Epic and correct as of today:  Health Maintenance   Topic Date Due     COVID-19 Vaccine (4 - Booster for Pfizer series) 01/12/2022     INFLUENZA VACCINE (1) Never done     DTAP/TDAP/TD IMMUNIZATION (2 - Td or Tdap) 01/28/2023     MEDICARE ANNUAL WELLNESS VISIT  04/27/2024     COLORECTAL CANCER SCREENING  08/13/2025     LIPID  04/27/2028     ADVANCE CARE PLANNING  04/27/2028     HEPATITIS C SCREENING  Completed     PHQ-2 (once per calendar year)  Completed     ZOSTER IMMUNIZATION  Completed     HIV SCREENING  Addressed     Pneumococcal Vaccine: Pediatrics (0 to 5 Years) and At-Risk Patients (6 to 64 Years)  Aged Out     IPV IMMUNIZATION  Aged Out     MENINGITIS IMMUNIZATION  Aged Out     Review of Systems   Constitutional: Positive for chills. Negative for fever.   HENT: Positive for hearing loss. Negative for congestion, ear pain and sore throat.    Eyes: Negative for pain and visual disturbance.   Respiratory: Positive for cough. Negative for shortness of breath.    Cardiovascular: Positive  "for peripheral edema. Negative for chest pain and palpitations.   Gastrointestinal: Positive for diarrhea. Negative for abdominal pain, constipation, heartburn, hematochezia and nausea.   Genitourinary: Positive for urgency. Negative for dysuria, frequency, genital sores, hematuria, impotence and penile discharge.   Musculoskeletal: Positive for myalgias. Negative for arthralgias and joint swelling.   Skin: Negative for rash.   Allergic/Immunologic: Negative for immunocompromised state.   Neurological: Positive for dizziness. Negative for weakness, headaches and paresthesias.   Hematological: Does not bruise/bleed easily.   Psychiatric/Behavioral: Negative for mood changes. The patient is nervous/anxious.        OBJECTIVE:   /68 (BP Location: Right arm, Patient Position: Sitting, Cuff Size: Adult Regular)   Pulse 102   Temp 97.3  F (36.3  C) (Temporal)   Resp 20   Ht 1.708 m (5' 7.25\")   Wt 81.6 kg (180 lb)   SpO2 94%   BMI 27.98 kg/m   Estimated body mass index is 27.98 kg/m  as calculated from the following:    Height as of this encounter: 1.708 m (5' 7.25\").    Weight as of this encounter: 81.6 kg (180 lb).  Physical Exam  Constitutional:       General: He is not in acute distress.     Appearance: He is well-developed. He is not diaphoretic.   HENT:      Head: Normocephalic and atraumatic.   Eyes:      General: No scleral icterus.     Conjunctiva/sclera: Conjunctivae normal.   Neck:      Vascular: No carotid bruit.   Cardiovascular:      Rate and Rhythm: Normal rate and regular rhythm.      Pulses: Normal pulses.   Pulmonary:      Effort: Pulmonary effort is normal.      Breath sounds: Normal breath sounds.   Abdominal:      Palpations: Abdomen is soft.      Tenderness: There is no abdominal tenderness.   Musculoskeletal:         General: No deformity.      Cervical back: Neck supple.      Right lower leg: Edema (trace) present.      Left lower leg: No edema.   Lymphadenopathy:      Cervical: No " cervical adenopathy.   Skin:     General: Skin is warm and dry.      Coloration: Skin is not jaundiced.      Findings: No rash.   Neurological:      General: No focal deficit present.      Mental Status: He is alert. Mental status is at baseline.   Psychiatric:         Mood and Affect: Mood normal.         Behavior: Behavior normal.         Diagnostic Test Results:  Labs reviewed in Epic  Results for orders placed or performed in visit on 04/27/23   Comprehensive Metabolic Panel     Status: Abnormal   Result Value Ref Range    Sodium 136 136 - 145 mmol/L    Potassium 4.9 3.4 - 5.3 mmol/L    Chloride 101 98 - 107 mmol/L    Carbon Dioxide (CO2) 25 22 - 29 mmol/L    Anion Gap 10 7 - 15 mmol/L    Urea Nitrogen 7.8 (L) 8.0 - 23.0 mg/dL    Creatinine 0.94 0.67 - 1.17 mg/dL    Calcium 9.7 8.8 - 10.2 mg/dL    Glucose 102 (H) 70 - 99 mg/dL    Alkaline Phosphatase 77 40 - 129 U/L    AST 93 (H) 10 - 50 U/L    ALT 74 (H) 10 - 50 U/L    Protein Total 7.4 6.4 - 8.3 g/dL    Albumin 4.0 3.5 - 5.2 g/dL    Bilirubin Total 0.8 <=1.2 mg/dL    GFR Estimate >90 >60 mL/min/1.73m2   Lipid Panel     Status: Abnormal   Result Value Ref Range    Cholesterol 200 (H) <200 mg/dL    Triglycerides 53 <150 mg/dL    Direct Measure  >=40 mg/dL    LDL Cholesterol Calculated 80 <=100 mg/dL    Non HDL Cholesterol 91 <130 mg/dL    Narrative    Cholesterol  Desirable:  <200 mg/dL    Triglycerides  Normal:  Less than 150 mg/dL  Borderline High:  150-199 mg/dL  High:  200-499 mg/dL  Very High:  Greater than or equal to 500 mg/dL    Direct Measure HDL  Female:  Greater than or equal to 50 mg/dL   Male:  Greater than or equal to 40 mg/dL    LDL Cholesterol  Desirable:  <100mg/dL  Above Desirable:  100-129 mg/dL   Borderline High:  130-159 mg/dL   High:  160-189 mg/dL   Very High:  >= 190 mg/dL    Non HDL Cholesterol  Desirable:  130 mg/dL  Above Desirable:  130-159 mg/dL  Borderline High:  160-189 mg/dL  High:  190-219 mg/dL  Very High:  Greater than  or equal to 220 mg/dL   PSA Screen GH     Status: Normal   Result Value Ref Range    Prostate Specific Antigen Screen 1.92 0.00 - 4.50 ng/mL    Narrative    This result is obtained using the Roche Elecsys total PSA method on the cherrie e601 immunoassay analyzer. Results obtained with different assay methods or kits cannot be used interchangeably.   CBC with platelets and differential     Status: Abnormal   Result Value Ref Range    WBC Count 7.1 4.0 - 11.0 10e3/uL    RBC Count 4.94 4.40 - 5.90 10e6/uL    Hemoglobin 17.0 13.3 - 17.7 g/dL    Hematocrit 48.5 40.0 - 53.0 %    MCV 98 78 - 100 fL    MCH 34.4 (H) 26.5 - 33.0 pg    MCHC 35.1 31.5 - 36.5 g/dL    RDW 12.9 10.0 - 15.0 %    Platelet Count 159 150 - 450 10e3/uL    % Neutrophils 69 %    % Lymphocytes 19 %    % Monocytes 11 %    % Eosinophils 1 %    % Basophils 0 %    % Immature Granulocytes 0 %    NRBCs per 100 WBC 0 <1 /100    Absolute Neutrophils 4.9 1.6 - 8.3 10e3/uL    Absolute Lymphocytes 1.3 0.8 - 5.3 10e3/uL    Absolute Monocytes 0.8 0.0 - 1.3 10e3/uL    Absolute Eosinophils 0.0 0.0 - 0.7 10e3/uL    Absolute Basophils 0.0 0.0 - 0.2 10e3/uL    Absolute Immature Granulocytes 0.0 <=0.4 10e3/uL    Absolute NRBCs 0.0 10e3/uL   CBC and Differential     Status: Abnormal    Narrative    The following orders were created for panel order CBC and Differential.  Procedure                               Abnormality         Status                     ---------                               -----------         ------                     CBC with platelets and d...[[id number]]  Abnormal            Final result                 Please view results for these tests on the individual orders.      CBC is normal.  PSA is normal.  Random total cholesterol is elevated.  Random glucose elevated.  Liver enzymes are elevated creatinine is at baseline.    Patient has been advised of split billing requirements and indicates understanding: Yes      COUNSELING:  Reviewed preventive health  "counseling, as reflected in patient instructions  Special attention given to:       Regular exercise       Healthy diet/nutrition       Vision screening       Hearing screening       Dental care       Bladder control       Fall risk prevention       Immunizations    BMI:   Estimated body mass index is 27.98 kg/m  as calculated from the following:    Height as of this encounter: 1.708 m (5' 7.25\").    Weight as of this encounter: 81.6 kg (180 lb).         He reports that he has never smoked. He has never used smokeless tobacco.      Appropriate preventive services were discussed with this patient, including applicable screening as appropriate for cardiovascular disease, diabetes, osteopenia/osteoporosis, and glaucoma.  As appropriate for age/gender, discussed screening for colorectal cancer, prostate cancer, breast cancer, and cervical cancer. Checklist reviewing preventive services available has been given to the patient.    Reviewed patients plan of care and provided an AVS. The Complex Care Plan (for patients with higher acuity and needing more deliberate coordination of services) for Janell meets the Care Plan requirement. This Care Plan has been established and reviewed with the Patient.          Amrit Patino MD  [facility]    Identified Health Risks:    I have reviewed Opioid Use Disorder and Substance Use Disorder risk factors and made any needed referrals.       He is at risk for lack of exercise and has been provided with information to increase physical activity for the benefit of his well-being.  "

## 2023-04-27 NOTE — LETTER
April 29, 2023      Janell Larsen  [address] [place] [address]        Dear ,    We are writing to inform you of your test results.    Liver enzymes are high.  Keep working to reduce your alcohol consumption.    PSA/prostate cancer lab is normal.  Cholesterol levels look pretty good.    Electronically signed by:  Amrit Patino MD  on April 29, 2023     Resulted Orders   Comprehensive Metabolic Panel   Result Value Ref Range    Sodium 136 136 - 145 mmol/L    Potassium 4.9 3.4 - 5.3 mmol/L    Chloride 101 98 - 107 mmol/L    Carbon Dioxide (CO2) 25 22 - 29 mmol/L    Anion Gap 10 7 - 15 mmol/L    Urea Nitrogen 7.8 (L) 8.0 - 23.0 mg/dL    Creatinine 0.94 0.67 - 1.17 mg/dL    Calcium 9.7 8.8 - 10.2 mg/dL    Glucose 102 (H) 70 - 99 mg/dL    Alkaline Phosphatase 77 40 - 129 U/L    AST 93 (H) 10 - 50 U/L    ALT 74 (H) 10 - 50 U/L    Protein Total 7.4 6.4 - 8.3 g/dL    Albumin 4.0 3.5 - 5.2 g/dL    Bilirubin Total 0.8 <=1.2 mg/dL    GFR Estimate >90 >60 mL/min/1.73m2      Comment:      eGFR calculated using 2021 CKD-EPI equation.   Lipid Panel   Result Value Ref Range    Cholesterol 200 (H) <200 mg/dL    Triglycerides 53 <150 mg/dL    Direct Measure  >=40 mg/dL    LDL Cholesterol Calculated 80 <=100 mg/dL    Non HDL Cholesterol 91 <130 mg/dL    Narrative    Cholesterol  Desirable:  <200 mg/dL    Triglycerides  Normal:  Less than 150 mg/dL  Borderline High:  150-199 mg/dL  High:  200-499 mg/dL  Very High:  Greater than or equal to 500 mg/dL    Direct Measure HDL  Female:  Greater than or equal to 50 mg/dL   Male:  Greater than or equal to 40 mg/dL    LDL Cholesterol  Desirable:  <100mg/dL  Above Desirable:  100-129 mg/dL   Borderline High:  130-159 mg/dL   High:  160-189 mg/dL   Very High:  >= 190 mg/dL    Non HDL Cholesterol  Desirable:  130 mg/dL  Above Desirable:  130-159 mg/dL  Borderline High:  160-189 mg/dL  High:  190-219 mg/dL  Very High:  Greater than or equal to 220 mg/dL   PSA  Screen GH   Result Value Ref Range    Prostate Specific Antigen Screen 1.92 0.00 - 4.50 ng/mL    Narrative    This result is obtained using the Roche Elecsys total PSA method on the cherrie e601 immunoassay analyzer. Results obtained with different assay methods or kits cannot be used interchangeably.   CBC with platelets and differential   Result Value Ref Range    WBC Count 7.1 4.0 - 11.0 10e3/uL    RBC Count 4.94 4.40 - 5.90 10e6/uL    Hemoglobin 17.0 13.3 - 17.7 g/dL    Hematocrit 48.5 40.0 - 53.0 %    MCV 98 78 - 100 fL    MCH 34.4 (H) 26.5 - 33.0 pg    MCHC 35.1 31.5 - 36.5 g/dL    RDW 12.9 10.0 - 15.0 %    Platelet Count 159 150 - 450 10e3/uL    % Neutrophils 69 %    % Lymphocytes 19 %    % Monocytes 11 %    % Eosinophils 1 %    % Basophils 0 %    % Immature Granulocytes 0 %    NRBCs per 100 WBC 0 <1 /100    Absolute Neutrophils 4.9 1.6 - 8.3 10e3/uL    Absolute Lymphocytes 1.3 0.8 - 5.3 10e3/uL    Absolute Monocytes 0.8 0.0 - 1.3 10e3/uL    Absolute Eosinophils 0.0 0.0 - 0.7 10e3/uL    Absolute Basophils 0.0 0.0 - 0.2 10e3/uL    Absolute Immature Granulocytes 0.0 <=0.4 10e3/uL    Absolute NRBCs 0.0 10e3/uL       If you have any questions or concerns, please call the clinic at the number listed above.       Sincerely,      Amrit Patino MD

## 2023-04-29 PROBLEM — R74.8 ELEVATED LIVER ENZYMES: Status: ACTIVE | Noted: 2023-04-29

## 2023-04-29 PROBLEM — Z78.9 REGULAR ALCOHOL CONSUMPTION: Status: ACTIVE | Noted: 2023-04-29

## 2024-05-23 DIAGNOSIS — I10 BENIGN ESSENTIAL HYPERTENSION: ICD-10-CM

## 2024-05-30 RX ORDER — LISINOPRIL 20 MG/1
20 TABLET ORAL DAILY
Qty: 90 TABLET | Refills: 0 | Status: SHIPPED | OUTPATIENT
Start: 2024-05-30 | End: 2024-08-30

## 2024-05-30 NOTE — TELEPHONE ENCOUNTER
[facility] sent Rx request for the following:      Requested Prescriptions   Pending Prescriptions Disp Refills    lisinopril (ZESTRIL) 20 MG tablet [Pharmacy Med Name: LISINOPRIL 20 MG TABLET] 90 tablet 4     Sig: TAKE 1 TABLET BY MOUTH EVERY DAY       ACE Inhibitors (Including Combos) Protocol Failed - 5/30/2024  1:53 PM   Last Prescription Date:   4/27/23  Last Fill Qty/Refills:         90, R-4    Last Office Visit:              4/27/23   Future Office visit:            none     Zulay Waldrop RN on 5/30/2024 at 1:55 PM

## 2024-08-28 DIAGNOSIS — I10 BENIGN ESSENTIAL HYPERTENSION: ICD-10-CM

## 2024-08-29 NOTE — TELEPHONE ENCOUNTER
Left message for patient to call back and schedule annual appointment.  Brenda Hong on 8/29/2024 at 10:19 AM

## 2024-08-29 NOTE — TELEPHONE ENCOUNTER
[facility] sent Rx request for the following:      Requested Prescriptions   Pending Prescriptions Disp Refills    lisinopril (ZESTRIL) 20 MG tablet [Pharmacy Med Name: LISINOPRIL 20 MG TABLET] 90 tablet 0     Sig: TAKE 1 TABLET BY MOUTH EVERY DAY       ACE Inhibitors (Including Combos) Protocol Failed - 8/29/2024  8:55 AM        Failed - Blood pressure under 140/90 in past 12 months- Clinicial or Patient Reported     BP Readings from Last 3 Encounters:   04/27/23 128/68   01/13/22 138/88   10/26/21 (!) 128/98       No data recorded            Failed - Recent (12 mo) or future (90 days) visit within the authorizing provider's specialty     The patient must have completed an in-person or virtual visit within the past 12 months or has a future visit scheduled within the next 90 days with the authorizing provider s specialty.  Urgent care and e-visits do not quality as an office visit for this protocol.          Failed - Most recent GFR on file in the past 12 months >30        Failed - Normal serum potassium on file in past 12 months     Recent Labs   Lab Test 04/27/23  1439   POTASSIUM 4.9          Last Prescription Date:   5/30/24  Last Fill Qty/Refills:         90, R-0    Last Office Visit:              4/27/23   Future Office visit:                  Per LOV note:  Continue - Lisinopril.     Pt due for annual exam. Routing to provider for refill consideration. Routing to Unit scheduling pool, to assist Pt in scheduling appointment.     Unable to complete prescription refill per RN Medication Refill Policy.     Kristian Avelar RN on 8/29/2024 at 8:57 AM'

## 2024-08-30 RX ORDER — LISINOPRIL 20 MG/1
20 TABLET ORAL DAILY
Qty: 90 TABLET | Refills: 0 | Status: SHIPPED | OUTPATIENT
Start: 2024-08-30

## 2024-12-15 ENCOUNTER — OFFICE VISIT (OUTPATIENT)
Dept: FAMILY MEDICINE | Facility: OTHER | Age: 64
End: 2024-12-15
Attending: REGISTERED NURSE
Payer: COMMERCIAL

## 2024-12-15 VITALS
OXYGEN SATURATION: 96 % | DIASTOLIC BLOOD PRESSURE: 82 MMHG | BODY MASS INDEX: 26.63 KG/M2 | SYSTOLIC BLOOD PRESSURE: 128 MMHG | HEART RATE: 92 BPM | WEIGHT: 179.8 LBS | TEMPERATURE: 98.5 F | HEIGHT: 69 IN | RESPIRATION RATE: 16 BRPM

## 2024-12-15 DIAGNOSIS — Z23 NEED FOR TDAP VACCINATION: ICD-10-CM

## 2024-12-15 DIAGNOSIS — S09.93XA: Primary | ICD-10-CM

## 2024-12-15 PROCEDURE — 250N000009 HC RX 250: Mod: JZ

## 2024-12-15 PROCEDURE — G0463 HOSPITAL OUTPT CLINIC VISIT: HCPCS

## 2024-12-15 PROCEDURE — 90471 IMMUNIZATION ADMIN: CPT

## 2024-12-15 RX ORDER — LIDOCAINE HYDROCHLORIDE 10 MG/ML
5 INJECTION, SOLUTION EPIDURAL; INFILTRATION; INTRACAUDAL; PERINEURAL ONCE
Status: COMPLETED | OUTPATIENT
Start: 2024-12-15 | End: 2024-12-15

## 2024-12-15 RX ADMIN — LIDOCAINE HYDROCHLORIDE 2 ML: 10 INJECTION, SOLUTION EPIDURAL; INFILTRATION; INTRACAUDAL; PERINEURAL at 13:02

## 2024-12-15 ASSESSMENT — PAIN SCALES - GENERAL: PAINLEVEL_OUTOF10: NO PAIN (0)

## 2024-12-15 ASSESSMENT — ENCOUNTER SYMPTOMS: WOUND: 1

## 2024-12-15 NOTE — PROGRESS NOTES
Janell Larsen  1960    ASSESSMENT/PLAN      1. Fish hook in cheek (Primary)  - lidocaine (PF) (XYLOCAINE) 1 % injection 5 mL  - TDAP 7+ (ADACEL,BOOSTRIX)  2. Need for Tdap vaccination  - TDAP 7+ (ADACEL,BOOSTRIX)    -Updated Tdap as last Tdap was in 2013.   -Monitor for signs symptoms of infection.  Patient understanding agreeable to plan of care.  - May use over-the-counter Tylenol or ibuprofen PRN  - Follow up as needed for new or worsening symptoms    Procedural note:  Options are discussed and decided to proceed with fish hook removal.  Risks discussed.  Verbal consent obtained.    Wound cleansed with alcohol prep  Anesthesia was obtained using 1 cc of lidocaine without epi.  Fish hook removed using pliers and gentle backward release method.  Wound cleansed with Hibiclens  Patient tolerated procedure well, no complications appreciated.   Wound dressing and care was discussed. Given warning signs for infection and instructed to return if they develop.  Patient was agreeable to this plan.          *Explanation of diagnosis, treatment options and risk and benefits of medications reviewed with patient. Patient agrees with plan of care.  *All questions were answered.    *Red flags symptoms were discussed and patient was advised when they should return for reevaluation or for prompt emergency evaluation.   *Patient was given verbal and written instructions on plan of care. Instructions were printed or are available on Mychart on electronic AVS.   *We discussed potential side effects of any prescribed or recommended therapies, as well as expectations for response to treatments.  *Patient discharged in stable condition    Nhan Wiggins, HERMANN, APRN, FNP-C  [facility]    SUBJECTIVE  CHIEF COMPLAINT/ REASON FOR VISIT  Patient presents with:  Trauma: [place], Left Cheek DOI 12-15-24     HISTORY OF PRESENT ILLNESS  Janell Larsen is a pleasant 64 year old male presents to [facility] today  "with a fishhook stuck in the left cheek.  This a.m. patient was out fishing with his wife, and as they were reeling in a fish the fishhook dislodged from the fish's mouth and landed one marcus into the patients left cheek.  Patient reports he tried to dislodge it but was unsuccessful.   History provided by patient      I have reviewed the nursing notes.  I have reviewed allergies, medication list, problem list, and past medical history.    REVIEW OF SYSTEMS  Review of Systems   Skin:  Positive for wound.          VITAL SIGNS  Vitals:    12/15/24 1222   BP: 128/82   BP Location: Right arm   Patient Position: Chair   Cuff Size: Adult Large   Pulse: 92   Resp: 16   Temp: 98.5  F (36.9  C)   TempSrc: Tympanic   SpO2: 96%   Weight: 81.6 kg (179 lb 12.8 oz)   Height: 1.753 m (5' 9\")      Body mass index is 26.55 kg/m .      OBJECTIVE  PHYSICAL EXAM  Physical Exam  Constitutional:       General: He is not in acute distress.     Appearance: Normal appearance. He is normal weight. He is not ill-appearing, toxic-appearing or diaphoretic.   HENT:      Head: Normocephalic and atraumatic.   Cardiovascular:      Rate and Rhythm: Normal rate and regular rhythm.      Pulses: Normal pulses.      Heart sounds: Normal heart sounds.   Pulmonary:      Effort: Pulmonary effort is normal.      Breath sounds: Normal breath sounds.   Skin:            Comments: Tremble hook with marcus stuck in left cheek.   Neurological:      Mental Status: He is alert.            DIAGNOSTICS  No results found for any visits on 12/15/24.   "

## 2025-01-15 ENCOUNTER — OFFICE VISIT (OUTPATIENT)
Dept: INTERNAL MEDICINE | Facility: OTHER | Age: 65
End: 2025-01-15
Attending: INTERNAL MEDICINE
Payer: MEDICARE

## 2025-01-15 VITALS
OXYGEN SATURATION: 96 % | WEIGHT: 174.6 LBS | DIASTOLIC BLOOD PRESSURE: 88 MMHG | RESPIRATION RATE: 20 BRPM | BODY MASS INDEX: 25.86 KG/M2 | HEIGHT: 69 IN | HEART RATE: 104 BPM | SYSTOLIC BLOOD PRESSURE: 138 MMHG | TEMPERATURE: 97.7 F

## 2025-01-15 DIAGNOSIS — Z12.5 ENCOUNTER FOR SCREENING FOR MALIGNANT NEOPLASM OF PROSTATE: ICD-10-CM

## 2025-01-15 DIAGNOSIS — Z00.00 MEDICARE ANNUAL WELLNESS VISIT, SUBSEQUENT: ICD-10-CM

## 2025-01-15 DIAGNOSIS — Z78.9 REGULAR ALCOHOL CONSUMPTION: ICD-10-CM

## 2025-01-15 DIAGNOSIS — Z98.890 S/P NISSEN FUNDOPLICATION (WITHOUT GASTROSTOMY TUBE) PROCEDURE: ICD-10-CM

## 2025-01-15 DIAGNOSIS — Z71.85 VACCINE COUNSELING: ICD-10-CM

## 2025-01-15 DIAGNOSIS — R19.7 FREQUENT DIARRHEA: ICD-10-CM

## 2025-01-15 DIAGNOSIS — E53.8 VITAMIN B12 DEFICIENCY: ICD-10-CM

## 2025-01-15 DIAGNOSIS — H91.93 HEARING PROBLEM OF BOTH EARS: ICD-10-CM

## 2025-01-15 DIAGNOSIS — Z23 NEED FOR PNEUMOCOCCAL VACCINATION: Primary | ICD-10-CM

## 2025-01-15 DIAGNOSIS — I10 BENIGN ESSENTIAL HYPERTENSION: ICD-10-CM

## 2025-01-15 DIAGNOSIS — D03.62 MELANOMA IN SITU OF LEFT UPPER EXTREMITY (H): ICD-10-CM

## 2025-01-15 DIAGNOSIS — R26.89 BALANCE PROBLEMS: ICD-10-CM

## 2025-01-15 DIAGNOSIS — E78.2 MIXED HYPERLIPIDEMIA: ICD-10-CM

## 2025-01-15 PROBLEM — U07.1 INFECTION DUE TO 2019 NOVEL CORONAVIRUS: Status: RESOLVED | Noted: 2022-06-15 | Resolved: 2025-01-15

## 2025-01-15 LAB
ALBUMIN SERPL BCG-MCNC: 4.2 G/DL (ref 3.5–5.2)
ALP SERPL-CCNC: 87 U/L (ref 40–150)
ALT SERPL W P-5'-P-CCNC: 58 U/L (ref 0–70)
ANION GAP SERPL CALCULATED.3IONS-SCNC: 13 MMOL/L (ref 7–15)
AST SERPL W P-5'-P-CCNC: 93 U/L (ref 0–45)
BASOPHILS # BLD AUTO: 0 10E3/UL (ref 0–0.2)
BASOPHILS NFR BLD AUTO: 1 %
BILIRUB SERPL-MCNC: 0.9 MG/DL
BUN SERPL-MCNC: 8.4 MG/DL (ref 8–23)
CALCIUM SERPL-MCNC: 9.4 MG/DL (ref 8.8–10.4)
CHLORIDE SERPL-SCNC: 103 MMOL/L (ref 98–107)
CHOLEST SERPL-MCNC: 225 MG/DL
CREAT SERPL-MCNC: 0.84 MG/DL (ref 0.67–1.17)
EGFRCR SERPLBLD CKD-EPI 2021: >90 ML/MIN/1.73M2
EOSINOPHIL # BLD AUTO: 0.1 10E3/UL (ref 0–0.7)
EOSINOPHIL NFR BLD AUTO: 1 %
ERYTHROCYTE [DISTWIDTH] IN BLOOD BY AUTOMATED COUNT: 13.3 % (ref 10–15)
FASTING STATUS PATIENT QL REPORTED: NO
FASTING STATUS PATIENT QL REPORTED: NO
GLUCOSE SERPL-MCNC: 99 MG/DL (ref 70–99)
HCO3 SERPL-SCNC: 24 MMOL/L (ref 22–29)
HCT VFR BLD AUTO: 49.1 % (ref 40–53)
HDLC SERPL-MCNC: 106 MG/DL
HGB BLD-MCNC: 17.7 G/DL (ref 13.3–17.7)
IMM GRANULOCYTES # BLD: 0 10E3/UL
IMM GRANULOCYTES NFR BLD: 0 %
LDLC SERPL CALC-MCNC: 107 MG/DL
LYMPHOCYTES # BLD AUTO: 1.5 10E3/UL (ref 0.8–5.3)
LYMPHOCYTES NFR BLD AUTO: 17 %
MCH RBC QN AUTO: 36.1 PG (ref 26.5–33)
MCHC RBC AUTO-ENTMCNC: 36 G/DL (ref 31.5–36.5)
MCV RBC AUTO: 100 FL (ref 78–100)
MONOCYTES # BLD AUTO: 0.9 10E3/UL (ref 0–1.3)
MONOCYTES NFR BLD AUTO: 10 %
NEUTROPHILS # BLD AUTO: 6.1 10E3/UL (ref 1.6–8.3)
NEUTROPHILS NFR BLD AUTO: 71 %
NONHDLC SERPL-MCNC: 119 MG/DL
NRBC # BLD AUTO: 0 10E3/UL
NRBC BLD AUTO-RTO: 0 /100
PLATELET # BLD AUTO: 200 10E3/UL (ref 150–450)
POTASSIUM SERPL-SCNC: 4.8 MMOL/L (ref 3.4–5.3)
PROT SERPL-MCNC: 7.4 G/DL (ref 6.4–8.3)
PSA SERPL DL<=0.01 NG/ML-MCNC: 2.27 NG/ML (ref 0–4.5)
RBC # BLD AUTO: 4.9 10E6/UL (ref 4.4–5.9)
SODIUM SERPL-SCNC: 140 MMOL/L (ref 135–145)
TRIGL SERPL-MCNC: 59 MG/DL
WBC # BLD AUTO: 8.6 10E3/UL (ref 4–11)

## 2025-01-15 PROCEDURE — 36415 COLL VENOUS BLD VENIPUNCTURE: CPT | Mod: ZL | Performed by: INTERNAL MEDICINE

## 2025-01-15 PROCEDURE — 84460 ALANINE AMINO (ALT) (SGPT): CPT | Mod: ZL | Performed by: INTERNAL MEDICINE

## 2025-01-15 PROCEDURE — 80061 LIPID PANEL: CPT | Mod: ZL | Performed by: INTERNAL MEDICINE

## 2025-01-15 PROCEDURE — 82465 ASSAY BLD/SERUM CHOLESTEROL: CPT | Mod: ZL | Performed by: INTERNAL MEDICINE

## 2025-01-15 PROCEDURE — 85004 AUTOMATED DIFF WBC COUNT: CPT | Mod: ZL | Performed by: INTERNAL MEDICINE

## 2025-01-15 PROCEDURE — 82947 ASSAY GLUCOSE BLOOD QUANT: CPT | Mod: ZL | Performed by: INTERNAL MEDICINE

## 2025-01-15 PROCEDURE — G0103 PSA SCREENING: HCPCS | Mod: ZL | Performed by: INTERNAL MEDICINE

## 2025-01-15 PROCEDURE — 82435 ASSAY OF BLOOD CHLORIDE: CPT | Mod: ZL | Performed by: INTERNAL MEDICINE

## 2025-01-15 RX ORDER — LOPERAMIDE HYDROCHLORIDE 2 MG/1
2 TABLET ORAL 4 TIMES DAILY PRN
Qty: 120 TABLET | Refills: 11 | Status: SHIPPED | OUTPATIENT
Start: 2025-01-15

## 2025-01-15 RX ORDER — LISINOPRIL 20 MG/1
20 TABLET ORAL DAILY
Qty: 90 TABLET | Refills: 4 | Status: SHIPPED | OUTPATIENT
Start: 2025-01-15

## 2025-01-15 RX ORDER — CYANOCOBALAMIN (VITAMIN B-12) 2500 MCG
2500 TABLET, SUBLINGUAL SUBLINGUAL DAILY
Qty: 100 TABLET | Refills: 4 | Status: SHIPPED | OUTPATIENT
Start: 2025-01-15

## 2025-01-15 SDOH — HEALTH STABILITY: PHYSICAL HEALTH: ON AVERAGE, HOW MANY DAYS PER WEEK DO YOU ENGAGE IN MODERATE TO STRENUOUS EXERCISE (LIKE A BRISK WALK)?: 2 DAYS

## 2025-01-15 ASSESSMENT — ENCOUNTER SYMPTOMS
WOUND: 0
DIZZINESS: 0
ARTHRALGIAS: 0
LIGHT-HEADEDNESS: 0
MYALGIAS: 0
WHEEZING: 0
BRUISES/BLEEDS EASILY: 0
ABDOMINAL PAIN: 0
NAUSEA: 0
CHILLS: 0
SHORTNESS OF BREATH: 0
DYSURIA: 0
FEVER: 0
DIARRHEA: 0
VOMITING: 0
COUGH: 0
AGITATION: 0
HEMATURIA: 0
CONFUSION: 0

## 2025-01-15 ASSESSMENT — SOCIAL DETERMINANTS OF HEALTH (SDOH): HOW OFTEN DO YOU GET TOGETHER WITH FRIENDS OR RELATIVES?: MORE THAN THREE TIMES A WEEK

## 2025-01-15 ASSESSMENT — PAIN SCALES - GENERAL: PAINLEVEL_OUTOF10: NO PAIN (0)

## 2025-01-15 NOTE — PROGRESS NOTES
"Assessment & Plan     ICD-10-CM    1. Medicare annual wellness visit, subsequent  Z00.00       2. Vaccine counseling  Z71.85       3. Benign essential hypertension  I10 lisinopril (ZESTRIL) 20 MG tablet     Comprehensive Metabolic Panel     CBC with Platelets & Differential      4. Mixed hyperlipidemia  E78.2 Lipid Panel      5. S/P Nissen fundoplication (without gastrostomy tube) procedure  Z98.890       6. Melanoma in situ of left upper extremity (H) -- left wrist -- 2018  D03.62       7. Regular alcohol consumption  Z78.9       8. Encounter for screening for malignant neoplasm of prostate  Z12.5 PSA Screen GH      9. Frequent diarrhea  R19.7 loperamide (IMODIUM A-D) 2 MG tablet      10. Hearing problem of both ears  H91.93       11. Vitamin B12 deficiency  E53.8 vitamin B complex with vitamin C (VITAMIN  B COMPLEX) tablet     vitamin B-12 (CYANOCOBALAMIN) 2500 MCG sublingual tablet      12. Balance problems  R26.89 vitamin B complex with vitamin C (VITAMIN  B COMPLEX) tablet     vitamin B-12 (CYANOCOBALAMIN) 2500 MCG sublingual tablet         Patient presents for ***    ***    HYPERTENSION - Ongoing. Blood pressure is currently well controlled.  Medication side effects: None. Denies syncope or presyncope.  Continue current medications.   Medication list reviewed/updated. Refills completed as needed.      MIXED HYPERLIPIDEMIA.  Ongoing. LDL is at goal: {YES-NO  Default Yes:4444}. Triglycerides are at goal: {YES-NO  Default Yes:4444}.  ***Hopefully lifestyle modifications will improve cholesterol levels, otherwise will consider additional medication dose adjustments or medication changes.  Medication side effects reported: {YES/NO/NONE:401384::\"None\"}.   Continue current medications for now - ***. Medication list reviewed/updated. Refills completed as needed.  ***     Chronic Kidney Disease, {CKD POA List:321697}, chronic, ongoing.  ***  Kidney function had been slowly declining.  Encourage NSAID avoidance.  " "  Recent Labs   Lab Test 04/27/23  1439 10/13/21  1710   CR 0.94 0.95   GFRESTIMATED >90 85        Vaccine counseling completed.  Encourage routine / annual vaccinations.    The longitudinal plan of care for the diagnosis(es)/condition(s) as documented were addressed during this visit. Due to the added complexity in care, I will continue to support Janell in the subsequent management and with ongoing continuity of care.      No results found for any visits on 01/15/25.   ***    {[facility] 2021 Documentation (Optional):176889}  {2021 E&M time (Optional):394953}     BMI  Estimated body mass index is 25.78 kg/m  as calculated from the following:    Height as of this encounter: 1.753 m (5' 9\").    Weight as of this encounter: 79.2 kg (174 lb 9.6 oz).     Counseling  Appropriate preventive services were addressed with this patient via screening, questionnaire, or discussion as appropriate for fall prevention, nutrition, physical activity, Tobacco-use cessation, social engagement, weight loss and cognition.  Checklist reviewing preventive services available has been given to the patient.  Reviewed patient's diet, addressing concerns and/or questions.   He is at risk for lack of exercise and has been provided with information to increase physical activity for the benefit of his well-being.   The patient was instructed to see the dentist every 6 months.   The patient reports drinking more than 3 alcoholic drinks per day and/or more than 7 drhnks per week. The patient was counseled and given information about possible harmful effects of excessive alcohol intake.  {FOLLOW UP PLANS (Optional) Includes COVID19 Treatment Plan:482012}    Return in about 1 year (around 1/15/2026) for Annual Medicare Wellness Visit.      Review of Systems   Constitutional:  Negative for chills and fever.   HENT:  Negative for congestion and hearing loss.    Eyes:  Negative for visual disturbance.   Respiratory:  Negative for cough, shortness of breath and " wheezing.    Cardiovascular:  Negative for chest pain. Peripheral edema: Hx of DVT of right leg - still taking Aspirin 81 mg daily.  Gastrointestinal:  Negative for abdominal pain, diarrhea, nausea and vomiting.         Hx of nissen fundoplication problems - ended up needing surgery to fix the problem    Endocrine: Negative for cold intolerance and heat intolerance.   Genitourinary:  Negative for dysuria and hematuria.   Musculoskeletal:  Negative for arthralgias and myalgias.   Skin:  Negative for rash and wound.   Allergic/Immunologic: Negative for immunocompromised state.   Neurological:  Negative for dizziness and light-headedness.   Hematological:  Does not bruise/bleed easily.   Psychiatric/Behavioral:  Negative for agitation and confusion.        Preventive Care Visit  [facility] [place]  Amrit Patino MD, Internal Medicine  Jorge 15, 2025  {Provider  Link to SmartSet :736297}    Patricia Maciel is a 64 year old, presenting for the following:  Medicare Visit      History of Present Illness       Reason for visit:  Physical     {MA/LPN/RN Pre-Provider Visit Orders- hCG/UA/Strep (Optional):833361}  {SUPERLIST (Optional):126510}  {additonal problems for provider to add (Optional):856062}  Health Care Directive  Patient has a Health Care Directive on file  Advance care planning document is on file and is current.      1/15/2025   General Health   How would you rate your overall physical health? Good   Feel stress (tense, anxious, or unable to sleep) Not at all         1/15/2025   Nutrition   Diet: Regular (no restrictions)         1/15/2025   Exercise   Days per week of moderate/strenous exercise 2 days   (!) EXERCISE CONCERN      1/15/2025   Social Factors   Frequency of gathering with friends or relatives More than three times a week   Worry food won't last until get money to buy more No   Food not last or not have enough money for food? No   Do you have housing? (Housing is defined as stable  permanent housing and does not include staying ouside in a car, in a tent, in an abandoned building, in an overnight shelter, or couch-surfing.) Yes   Are you worried about losing your housing? No   Lack of transportation? No   Unable to get utilities (heat,electricity)? No         1/15/2025   Fall Risk   Fallen 2 or more times in the past year? No   Trouble with walking or balance? Yes     {Positive Fall Risk- Gait Speed Test is required and was not documented before note was started.  If results do not appear, ask staff to complete.  Once completed, refresh note to pull in results Click here to link Gait Speed Test  :018004}      1/15/2025   Activities of Daily Living- Home Safety   Needs help with the following daily activites None of the above   Safety concerns in the home None of the above         1/15/2025   Dental   Dentist two times every year? (!) NO         1/15/2025   Hearing Screening   Hearing concerns? None of the above         1/15/2025   Driving Risk Screening   Patient/family members have concerns about driving No         1/15/2025   General Alertness/Fatigue Screening   Have you been more tired than usual lately? No         1/15/2025   Urinary Incontinence Screening   Bothered by leaking urine in past 6 months No         1/15/2025   TB Screening   Were you born outside of the [place]? No         Today's PHQ-2 Score:       1/15/2025     1:36 PM   PHQ-2 ( 1999 [employer])   Q1: Little interest or pleasure in doing things 0    Q2: Feeling down, depressed or hopeless 0    PHQ-2 Score 0    Q1: Little interest or pleasure in doing things Not at all   Q2: Feeling down, depressed or hopeless Not at all   PHQ-2 Score 0       Proxy-reported           1/15/2025   Substance Use   Alcohol more than 3/day or more than 7/wk Yes   How often do you have a drink containing alcohol 4 or more times a week   How many alcohol drinks on typical day 3 or 4   How often do you have 5+ drinks at one occasion Weekly   Audit 2/3 Score  4   How often not able to stop drinking once started Never   How often failed to do what normally expected Never   How often needed first drink in am after a heavy drinking session Never   How often feeling of guilt or remorse after drinking Never   How often unable to remember what happened the night before Never   Have you or someone else been injured because of your drinking No   Has anyone been concerned or suggested you cut down on drinking No   TOTAL SCORE - AUDIT 8   Do you have a current opioid prescription? No   How severe/bad is pain from 1 to 10? 0/10 (No Pain)   Do you use any other substances recreationally? No     Social History     Tobacco Use    Smoking status: Never     Passive exposure: Never    Smokeless tobacco: Never   Vaping Use    Vaping status: Never Used   Substance Use Topics    Alcohol use: Yes     Comment: whiskey:  3 drinks nightly    Drug use: Never     {Provider  If there are gaps in the social history shown above, please follow the link to update and then refresh the note Link to Social and Substance History :260631}      1/15/2025   AAA Screening   Family history of Abdominal Aortic Aneurysm (AAA)? No   Last PSA:   Prostate Specific Antigen Screen   Date Value Ref Range Status   04/27/2023 1.92 0.00 - 4.50 ng/mL Final   10/13/2021 0.79 0.00 - 4.00 ug/L Final     ASCVD Risk   The ASCVD Risk score (Manuel ALVARENGA, et al., 2019) failed to calculate for the following reasons:    The valid HDL cholesterol range is 20 to 100 mg/dL    {Link to Fracture Risk Assessment Tool (Optional):954499}    {Provider  REQUIRED FOR AWV Use the storyboard to review patient history, after sections have been marked as reviewed, refresh note to capture documentation:213738}  {Provider   REQUIRED AWV use this link to review and update sexual activity history  after section has been marked as reviewed, refresh note to capture documentation:059281}  Reviewed and updated as needed this visit by Provider    "Tobacco  Allergies  Meds  Problems  Med Hx  Surg Hx  Fam Hx     Sexual Activity          {HISTORY OPTIONS (Optional):493322}  Current providers sharing in care for this patient include:  Patient Care Team:  Amrit Patino MD as PCP - General (Internal Medicine)  Amrit Patino MD as Assigned PCP    The following health maintenance items are reviewed in Epic and correct as of today:  Health Maintenance   Topic Date Due    Pneumococcal Vaccine: 50+ Years (1 of 1 - PCV) Never done    BMP  04/27/2024    LIPID  04/27/2024    COLORECTAL CANCER SCREENING  08/13/2025    MEDICARE ANNUAL WELLNESS VISIT  01/15/2026    FALL RISK ASSESSMENT  01/15/2026    GLUCOSE  04/27/2026    ADVANCE CARE PLANNING  01/15/2030    RSV VACCINE (1 - 1-dose 75+ series) 04/30/2033    DTAP/TDAP/TD IMMUNIZATION (3 - Td or Tdap) 12/15/2034    HEPATITIS C SCREENING  Completed    PHQ-2 (once per calendar year)  Completed    ZOSTER IMMUNIZATION  Completed    HPV IMMUNIZATION  Aged Out    MENINGITIS IMMUNIZATION  Aged Out    RSV MONOCLONAL ANTIBODY  Aged Out    INFLUENZA VACCINE  Discontinued    COVID-19 Vaccine  Discontinued       {ROS Picklists (Optional):688478}     Objective    Exam  /88   Pulse 104   Temp 97.7  F (36.5  C)   Resp 20   Ht 1.753 m (5' 9\")   Wt 79.2 kg (174 lb 9.6 oz)   SpO2 96%   BMI 25.78 kg/m     Estimated body mass index is 25.78 kg/m  as calculated from the following:    Height as of this encounter: 1.753 m (5' 9\").    Weight as of this encounter: 79.2 kg (174 lb 9.6 oz).    Physical Exam  Constitutional:       General: He is not in acute distress.     Appearance: Normal appearance. He is well-developed. He is not diaphoretic.   Eyes:      General: No scleral icterus.     Conjunctiva/sclera: Conjunctivae normal.   Neck:      Vascular: No carotid bruit.   Cardiovascular:      Rate and Rhythm: Normal rate and regular rhythm.      Pulses: Normal pulses.   Pulmonary:      Effort: Pulmonary effort is normal.      " Breath sounds: Normal breath sounds.   Abdominal:      Palpations: Abdomen is soft.      Tenderness: There is no abdominal tenderness.   Musculoskeletal:         General: No deformity.      Cervical back: Neck supple.      Right lower leg: No edema.      Left lower leg: No edema.   Skin:     General: Skin is warm and dry.      Coloration: Skin is not jaundiced.      Findings: No rash.   Neurological:      Mental Status: He is alert. Mental status is at baseline.   Psychiatric:         Mood and Affect: Mood normal.         Behavior: Behavior normal.       {Exam Choices (Optional):011569}        1/15/2025   Mini Cog   Clock Draw Score 2 Normal   3 Item Recall 2 objects recalled   Mini Cog Total Score 4     {A Mini-Cog total score of 0-2 suggests the possibility of dementia, score of 3-5 suggests no dementia:707423}         Signed Electronically by: Amrit Patino MD  {Email feedback regarding this note to [email or web address]   :876767}

## 2025-01-15 NOTE — PATIENT INSTRUCTIONS
Blood pressure is controlled.     Medications refilled.   Labs are pending.     Pneumococcal 20 vaccine today.     Return in approximately 1 year, or sooner as needed for follow-up with Dr. Patino.  - Annual Follow-up / Physical - Medicare Annual Wellness Visit     Clinic : [phone]  Appointment line: [phone]

## 2025-04-01 ENCOUNTER — TELEPHONE (OUTPATIENT)
Dept: INTERNAL MEDICINE | Facility: OTHER | Age: 65
End: 2025-04-01
Payer: COMMERCIAL

## 2025-04-01 DIAGNOSIS — Z78.9 REGULAR ALCOHOL CONSUMPTION: Primary | ICD-10-CM

## 2025-04-01 DIAGNOSIS — R74.8 ELEVATED LIVER ENZYMES: ICD-10-CM

## 2025-04-01 NOTE — TELEPHONE ENCOUNTER
Patient states that he has quit drinking for 2 months and would like to have his liver function tested again.  Please place lab orders.     Please call patient back     Thank you   Roberta Dalton on 4/1/2025 at 8:36 AM

## 2025-04-07 ENCOUNTER — LAB (OUTPATIENT)
Dept: LAB | Facility: OTHER | Age: 65
End: 2025-04-07
Attending: INTERNAL MEDICINE
Payer: MEDICARE

## 2025-04-07 DIAGNOSIS — Z78.9 REGULAR ALCOHOL CONSUMPTION: ICD-10-CM

## 2025-04-07 DIAGNOSIS — R74.8 ELEVATED LIVER ENZYMES: ICD-10-CM

## 2025-04-07 LAB
ALBUMIN SERPL BCG-MCNC: 3.8 G/DL (ref 3.5–5.2)
ALP SERPL-CCNC: 80 U/L (ref 40–150)
ALT SERPL W P-5'-P-CCNC: 18 U/L (ref 0–70)
AST SERPL W P-5'-P-CCNC: 25 U/L (ref 0–45)
BILIRUB DIRECT SERPL-MCNC: 0.15 MG/DL (ref 0–0.3)
BILIRUB SERPL-MCNC: 0.5 MG/DL
PROT SERPL-MCNC: 6.9 G/DL (ref 6.4–8.3)

## 2025-04-07 PROCEDURE — 36415 COLL VENOUS BLD VENIPUNCTURE: CPT | Mod: ZL

## 2025-04-07 PROCEDURE — 82040 ASSAY OF SERUM ALBUMIN: CPT | Mod: ZL

## 2025-04-07 PROCEDURE — 84155 ASSAY OF PROTEIN SERUM: CPT | Mod: ZL

## 2025-08-12 ENCOUNTER — OFFICE VISIT (OUTPATIENT)
Dept: FAMILY MEDICINE | Facility: OTHER | Age: 65
End: 2025-08-12
Attending: FAMILY MEDICINE
Payer: MEDICARE

## 2025-08-12 VITALS
SYSTOLIC BLOOD PRESSURE: 122 MMHG | DIASTOLIC BLOOD PRESSURE: 76 MMHG | HEART RATE: 88 BPM | HEIGHT: 67 IN | WEIGHT: 185 LBS | TEMPERATURE: 97.7 F | OXYGEN SATURATION: 94 % | BODY MASS INDEX: 29.03 KG/M2 | RESPIRATION RATE: 18 BRPM

## 2025-08-12 DIAGNOSIS — Z01.818 PREOP GENERAL PHYSICAL EXAM: Primary | ICD-10-CM

## 2025-08-12 LAB
ANION GAP SERPL CALCULATED.3IONS-SCNC: 12 MMOL/L (ref 7–15)
BUN SERPL-MCNC: 9.4 MG/DL (ref 8–23)
CALCIUM SERPL-MCNC: 9.4 MG/DL (ref 8.8–10.4)
CHLORIDE SERPL-SCNC: 105 MMOL/L (ref 98–107)
CREAT SERPL-MCNC: 0.93 MG/DL (ref 0.67–1.17)
EGFRCR SERPLBLD CKD-EPI 2021: 90 ML/MIN/1.73M2
GLUCOSE SERPL-MCNC: 124 MG/DL (ref 70–99)
HCO3 SERPL-SCNC: 20 MMOL/L (ref 22–29)
HGB BLD-MCNC: 16.4 G/DL (ref 13.3–17.7)
MCV RBC AUTO: 93 FL (ref 78–100)
POTASSIUM SERPL-SCNC: 4.5 MMOL/L (ref 3.4–5.3)
SODIUM SERPL-SCNC: 137 MMOL/L (ref 135–145)

## 2025-08-12 PROCEDURE — 85018 HEMOGLOBIN: CPT | Mod: ZL | Performed by: FAMILY MEDICINE

## 2025-08-12 PROCEDURE — 82374 ASSAY BLOOD CARBON DIOXIDE: CPT | Mod: ZL | Performed by: FAMILY MEDICINE

## 2025-08-12 PROCEDURE — 36415 COLL VENOUS BLD VENIPUNCTURE: CPT | Mod: ZL | Performed by: FAMILY MEDICINE

## 2025-08-12 ASSESSMENT — PAIN SCALES - GENERAL: PAINLEVEL_OUTOF10: NO PAIN (0)

## 2025-08-14 LAB
ATRIAL RATE - MUSE: 62 BPM
DIASTOLIC BLOOD PRESSURE - MUSE: NORMAL MMHG
INTERPRETATION ECG - MUSE: NORMAL
P AXIS - MUSE: 40 DEGREES
PR INTERVAL - MUSE: 182 MS
QRS DURATION - MUSE: 94 MS
QT - MUSE: 410 MS
QTC - MUSE: 416 MS
R AXIS - MUSE: -23 DEGREES
SYSTOLIC BLOOD PRESSURE - MUSE: NORMAL MMHG
T AXIS - MUSE: 5 DEGREES
VENTRICULAR RATE- MUSE: 62 BPM

## 2025-08-20 ENCOUNTER — TRANSFERRED RECORDS (OUTPATIENT)
Dept: HEALTH INFORMATION MANAGEMENT | Facility: OTHER | Age: 65
End: 2025-08-20
Payer: COMMERCIAL

## 2025-08-27 ENCOUNTER — TELEPHONE (OUTPATIENT)
Dept: INTERNAL MEDICINE | Facility: OTHER | Age: 65
End: 2025-08-27
Payer: COMMERCIAL

## 2025-08-27 DIAGNOSIS — R19.7 FREQUENT DIARRHEA: Primary | ICD-10-CM

## 2025-08-27 RX ORDER — CHOLESTYRAMINE 4 G/9G
1-4 POWDER, FOR SUSPENSION ORAL
Qty: 378 G | Refills: 4 | Status: SHIPPED | OUTPATIENT
Start: 2025-08-27

## (undated) RX ORDER — LIDOCAINE HYDROCHLORIDE 10 MG/ML
INJECTION, SOLUTION EPIDURAL; INFILTRATION; INTRACAUDAL; PERINEURAL
Status: DISPENSED
Start: 2024-12-15